# Patient Record
Sex: FEMALE | Race: WHITE | Employment: FULL TIME | URBAN - NONMETROPOLITAN AREA
[De-identification: names, ages, dates, MRNs, and addresses within clinical notes are randomized per-mention and may not be internally consistent; named-entity substitution may affect disease eponyms.]

---

## 2021-01-28 ENCOUNTER — TELEPHONE (OUTPATIENT)
Dept: OBGYN CLINIC | Age: 36
End: 2021-01-28

## 2021-05-10 ENCOUNTER — TELEPHONE (OUTPATIENT)
Dept: FAMILY MEDICINE CLINIC | Age: 36
End: 2021-05-10

## 2021-05-12 DIAGNOSIS — R53.83 FATIGUE, UNSPECIFIED TYPE: ICD-10-CM

## 2021-05-12 LAB
ALBUMIN SERPL-MCNC: 4.9 G/DL (ref 3.5–5.2)
ALP BLD-CCNC: 98 U/L (ref 35–104)
ALT SERPL-CCNC: 19 U/L (ref 5–33)
ANION GAP SERPL CALCULATED.3IONS-SCNC: 12 MMOL/L (ref 7–19)
AST SERPL-CCNC: 24 U/L (ref 5–32)
BASOPHILS ABSOLUTE: 0 K/UL (ref 0–0.2)
BASOPHILS RELATIVE PERCENT: 0.5 % (ref 0–1)
BILIRUB SERPL-MCNC: 0.3 MG/DL (ref 0.2–1.2)
BUN BLDV-MCNC: 16 MG/DL (ref 6–20)
CALCIUM SERPL-MCNC: 9.9 MG/DL (ref 8.6–10)
CHLORIDE BLD-SCNC: 105 MMOL/L (ref 98–111)
CO2: 26 MMOL/L (ref 22–29)
CREAT SERPL-MCNC: 0.7 MG/DL (ref 0.5–0.9)
EOSINOPHILS ABSOLUTE: 0.1 K/UL (ref 0–0.6)
EOSINOPHILS RELATIVE PERCENT: 0.6 % (ref 0–5)
GFR AFRICAN AMERICAN: >59
GFR NON-AFRICAN AMERICAN: >60
GLUCOSE BLD-MCNC: 102 MG/DL (ref 74–109)
HCT VFR BLD CALC: 41.1 % (ref 37–47)
HEMOGLOBIN: 14.1 G/DL (ref 12–16)
IMMATURE GRANULOCYTES #: 0 K/UL
LYMPHOCYTES ABSOLUTE: 2.3 K/UL (ref 1.1–4.5)
LYMPHOCYTES RELATIVE PERCENT: 25.8 % (ref 20–40)
MCH RBC QN AUTO: 30.9 PG (ref 27–31)
MCHC RBC AUTO-ENTMCNC: 34.3 G/DL (ref 33–37)
MCV RBC AUTO: 89.9 FL (ref 81–99)
MONOCYTES ABSOLUTE: 0.7 K/UL (ref 0–0.9)
MONOCYTES RELATIVE PERCENT: 7.6 % (ref 0–10)
NEUTROPHILS ABSOLUTE: 5.8 K/UL (ref 1.5–7.5)
NEUTROPHILS RELATIVE PERCENT: 65.2 % (ref 50–65)
PDW BLD-RTO: 12.6 % (ref 11.5–14.5)
PLATELET # BLD: 320 K/UL (ref 130–400)
PMV BLD AUTO: 9.6 FL (ref 9.4–12.3)
POTASSIUM SERPL-SCNC: 4.1 MMOL/L (ref 3.5–5)
RBC # BLD: 4.57 M/UL (ref 4.2–5.4)
SODIUM BLD-SCNC: 143 MMOL/L (ref 136–145)
T4 FREE: 1.1 NG/DL (ref 0.93–1.7)
TOTAL PROTEIN: 7.8 G/DL (ref 6.6–8.7)
TSH SERPL DL<=0.05 MIU/L-ACNC: 1.07 UIU/ML (ref 0.27–4.2)
VITAMIN B-12: 730 PG/ML (ref 211–946)
VITAMIN D 25-HYDROXY: 46.4 NG/ML
WBC # BLD: 8.8 K/UL (ref 4.8–10.8)

## 2023-12-12 ENCOUNTER — OFFICE VISIT (OUTPATIENT)
Dept: PRIMARY CARE CLINIC | Age: 38
End: 2023-12-12
Payer: MEDICAID

## 2023-12-12 VITALS
OXYGEN SATURATION: 98 % | TEMPERATURE: 98 F | SYSTOLIC BLOOD PRESSURE: 106 MMHG | HEART RATE: 77 BPM | WEIGHT: 187 LBS | DIASTOLIC BLOOD PRESSURE: 72 MMHG | HEIGHT: 64 IN | BODY MASS INDEX: 31.92 KG/M2

## 2023-12-12 DIAGNOSIS — N76.0 BACTERIAL VAGINOSIS: ICD-10-CM

## 2023-12-12 DIAGNOSIS — F41.1 GAD (GENERALIZED ANXIETY DISORDER): ICD-10-CM

## 2023-12-12 DIAGNOSIS — B96.89 BACTERIAL VAGINOSIS: ICD-10-CM

## 2023-12-12 DIAGNOSIS — M79.7 FIBROMYALGIA: ICD-10-CM

## 2023-12-12 DIAGNOSIS — Z12.31 SCREENING MAMMOGRAM FOR BREAST CANCER: ICD-10-CM

## 2023-12-12 DIAGNOSIS — F17.210 CIGARETTE NICOTINE DEPENDENCE WITHOUT COMPLICATION: ICD-10-CM

## 2023-12-12 DIAGNOSIS — F33.0 MILD EPISODE OF RECURRENT MAJOR DEPRESSIVE DISORDER (HCC): ICD-10-CM

## 2023-12-12 DIAGNOSIS — Z00.00 WELL ADULT EXAM: ICD-10-CM

## 2023-12-12 PROCEDURE — 99406 BEHAV CHNG SMOKING 3-10 MIN: CPT | Performed by: NURSE PRACTITIONER

## 2023-12-12 PROCEDURE — 99396 PREV VISIT EST AGE 40-64: CPT | Performed by: NURSE PRACTITIONER

## 2023-12-12 PROCEDURE — G8484 FLU IMMUNIZE NO ADMIN: HCPCS | Performed by: NURSE PRACTITIONER

## 2023-12-12 RX ORDER — NICOTINE 21 MG/24HR
1 PATCH, TRANSDERMAL 24 HOURS TRANSDERMAL DAILY
Qty: 42 PATCH | Refills: 0 | Status: SHIPPED | OUTPATIENT
Start: 2023-12-12 | End: 2024-01-23

## 2023-12-12 RX ORDER — NICOTINE 21 MG/24HR
1 PATCH, TRANSDERMAL 24 HOURS TRANSDERMAL DAILY
Qty: 14 PATCH | Refills: 0 | Status: SHIPPED | OUTPATIENT
Start: 2023-12-12 | End: 2023-12-26

## 2023-12-12 ASSESSMENT — ENCOUNTER SYMPTOMS
SHORTNESS OF BREATH: 0
WHEEZING: 0
ABDOMINAL PAIN: 0
CHEST TIGHTNESS: 0
DIARRHEA: 0
SORE THROAT: 0
COUGH: 0
NAUSEA: 0

## 2023-12-12 NOTE — PROGRESS NOTES
benefits of plan. Counseling: Alysha Juanito Altamirano's case, medications and options were discussed in detail. Patient was instructed to call the office if she questionsregarding her treatment. Should her conditions worsen, she should return to office to be reassessed by LUCAS Velasquez. she Should to go the closest Emergency Department for any emergency. They verbalizedunderstanding the above instructions.    Return in about 1 year (around 12/12/2024) for physical.

## 2023-12-28 ENCOUNTER — OFFICE VISIT (OUTPATIENT)
Age: 38
End: 2023-12-28

## 2023-12-28 VITALS
RESPIRATION RATE: 20 BRPM | BODY MASS INDEX: 32.17 KG/M2 | TEMPERATURE: 97.9 F | DIASTOLIC BLOOD PRESSURE: 70 MMHG | HEART RATE: 90 BPM | OXYGEN SATURATION: 95 % | HEIGHT: 64 IN | WEIGHT: 188.4 LBS | SYSTOLIC BLOOD PRESSURE: 108 MMHG

## 2023-12-28 DIAGNOSIS — Z11.1 ENCOUNTER FOR PPD TEST: Primary | ICD-10-CM

## 2023-12-28 NOTE — PROGRESS NOTES
[address]  [address] [place] [address]  Dept: [phone]  Dept Fax: [phone]  Loc: [phone]    Mariel Rutherford is a 34 y.o. female who presents today for her medical conditions/complaints as noted below. Mariel Rutherford is complaining of No chief complaint on file. HPI:   Pt here for TB skin test.  Pt instructed to return in 48-72 hours for TB skin test to be read. Pt verbalized understanding and agreement. Past Medical History:   Diagnosis Date    Chronic back pain     Depression     Fibromyalgia     MYA (generalized anxiety disorder) 2021    History of chicken pox     Major depressive disorder     Marijuana abuse     Persistent mood (affective) disorder, unspecified ([facility]) 2021       Past Surgical History:   Procedure Laterality Date    CHOLECYSTECTOMY         Family History   Problem Relation Age of Onset    Breast Cancer Maternal Grandmother     High Cholesterol Father     Other Mother         breast cancer gene (not BRCA), has had prophylactic mastectomy       Social History     Tobacco Use    Smoking status: Every Day     Current packs/day: 0.00     Average packs/day: 1 pack/day for 15.0 years (15.0 ttl pk-yrs)     Types: Cigarettes     Start date: 2002     Last attempt to quit: 2017     Years since quittin.1    Smokeless tobacco: Never   Substance Use Topics    Alcohol use: Yes     Comment: occasional        Current Outpatient Medications   Medication Sig Dispense Refill    nicotine (NICODERM CQ) 21 MG/24HR Place 1 patch onto the skin daily 42 patch 0    hydrOXYzine HCl (ATARAX) 25 MG tablet TAKE 1 TABLET BY MOUTH THREE TIMES DAILY AS NEEDED FOR ANXIETY 90 tablet 2    FLUoxetine (PROZAC) 40 MG capsule Take 1 capsule by mouth daily 30 capsule 2    neomycin-bacitracin-polymyxin (NEOSPORIN) 5-400-5000 ointment Apply topically 4 times daily.  1 each 1    fluticasone (FLONASE) 50 MCG/ACT

## 2023-12-28 NOTE — PROGRESS NOTES
Medication was administered by Gary Pitts at 10:37 AM.    Medication: tuberculin(mantoux)  Amount: 0.1ML  Route: intradermal  Site: forearm left  Patient was informed to return to the clinic for reading 48 to 72 hours. Patient tolerated well.

## 2024-01-16 ENCOUNTER — TELEPHONE (OUTPATIENT)
Dept: PSYCHIATRY | Age: 39
End: 2024-01-16

## 2024-01-16 RX ORDER — BUPROPION HYDROCHLORIDE 300 MG/1
300 TABLET ORAL EVERY MORNING
Qty: 30 TABLET | Refills: 2 | Status: SHIPPED | OUTPATIENT
Start: 2024-01-16

## 2024-01-16 NOTE — TELEPHONE ENCOUNTER
erectile dysfunction; WOMEN: LMP, heavy menstrual bleeding (menorrhagia), irregular periods, postmenopausal bleeding, menstrual pain (dymenorrhea, vaginal discharge)     Musculoskeletal: (bone pain/fracture, joint pain or swelling, musle pain)     Integumentary: (rashes, acne, non-healing sores, itching, breast lumps, breast pain, nipple discharge, hair loss)     Neurologic: (HA, muscle weakness, paresthesias (numbness, coldness, crawling or prickling), memory loss, seizure, dizziness)     Psychiatric:  (anxiety, sadness, irritability/anger, insomnia, suicidality)     Endocrine: (heat or cold intolerance, excessive thirst (polydipsia), excessive hunger (polyphagia))     Immune/Allergic: (hives, seasonal or environmental allergies, HIV exposure)     Hematologic/Lymphatic: (lymph node enlargement, easy bleeding or bruising)     History obtained via chart review and patient     PCP is Traci Conroy MD         Current Meds:     Home Medications           Prior to Admission medications    Medication Sig Start Date End Date Taking? Authorizing Provider   FLUoxetine (PROZAC) 40 MG capsule Take 1 capsule by mouth daily 11/16/23   Yes Mihai Smith APRN - CNP   buPROPion (WELLBUTRIN XL) 300 MG extended release tablet Take 1 tablet by mouth every morning 10/6/23 11/5/23   Gretchen Patel MD   neomycin-bacitracin-polymyxin (NEOSPORIN) 5-400-5000 ointment Apply topically 4 times daily. 7/2/23     Yasmin Walter MD   fluticasone (FLONASE) 50 MCG/ACT nasal spray 1 spray by Each Nostril route daily 6/22/23     Traci Conroy MD   hydrOXYzine (ATARAX) 25 MG tablet Take 1 tablet by mouth 3 times daily as needed for Anxiety 4/7/22     Mihai Smith APRN - CNP   MIRENA, 52 MG, IUD 52 mg   2/14/22     Provider, MD Josselin         Social History   Social History            Socioeconomic History    Marital status:    Tobacco Use    Smoking status: Every Day       Packs/day: 1.00       Years: 15.00

## 2024-01-16 NOTE — TELEPHONE ENCOUNTER
Called and lvm asking pt to return phone call    When pt calls back Ma will let her know that her script for bupropion was sent to her pharmacy     Electronically signed by Srinivasan Zamarripa on 1/16/2024 at 4:16 PM

## 2024-01-24 ENCOUNTER — TELEPHONE (OUTPATIENT)
Dept: PRIMARY CARE CLINIC | Age: 39
End: 2024-01-24

## 2024-01-24 DIAGNOSIS — Z11.1 SCREENING EXAMINATION FOR PULMONARY TUBERCULOSIS: Primary | ICD-10-CM

## 2024-01-24 DIAGNOSIS — Z00.00 WELL ADULT EXAM: ICD-10-CM

## 2024-01-24 DIAGNOSIS — Z11.1 SCREENING FOR TUBERCULOSIS: ICD-10-CM

## 2024-01-24 LAB
ALBUMIN SERPL-MCNC: 4.8 G/DL (ref 3.5–5.2)
ALP SERPL-CCNC: 97 U/L (ref 35–104)
ALT SERPL-CCNC: 25 U/L (ref 5–33)
ANION GAP SERPL CALCULATED.3IONS-SCNC: 11 MMOL/L (ref 7–19)
AST SERPL-CCNC: 28 U/L (ref 5–32)
BASOPHILS # BLD: 0.1 K/UL (ref 0–0.2)
BASOPHILS NFR BLD: 0.7 % (ref 0–1)
BILIRUB SERPL-MCNC: 0.6 MG/DL (ref 0.2–1.2)
BUN SERPL-MCNC: 15 MG/DL (ref 6–20)
CALCIUM SERPL-MCNC: 9.5 MG/DL (ref 8.6–10)
CHLORIDE SERPL-SCNC: 104 MMOL/L (ref 98–111)
CHOLEST SERPL-MCNC: 210 MG/DL (ref 160–199)
CO2 SERPL-SCNC: 26 MMOL/L (ref 22–29)
CREAT SERPL-MCNC: 0.7 MG/DL (ref 0.5–0.9)
EOSINOPHIL # BLD: 0.1 K/UL (ref 0–0.6)
EOSINOPHIL NFR BLD: 1.3 % (ref 0–5)
ERYTHROCYTE [DISTWIDTH] IN BLOOD BY AUTOMATED COUNT: 12.3 % (ref 11.5–14.5)
GLUCOSE SERPL-MCNC: 98 MG/DL (ref 74–109)
HCT VFR BLD AUTO: 41.7 % (ref 37–47)
HDLC SERPL-MCNC: 61 MG/DL (ref 65–121)
HGB BLD-MCNC: 13.9 G/DL (ref 12–16)
IMM GRANULOCYTES # BLD: 0 K/UL
LDLC SERPL CALC-MCNC: 128 MG/DL
LYMPHOCYTES # BLD: 2.3 K/UL (ref 1.1–4.5)
LYMPHOCYTES NFR BLD: 34.3 % (ref 20–40)
MCH RBC QN AUTO: 29.8 PG (ref 27–31)
MCHC RBC AUTO-ENTMCNC: 33.3 G/DL (ref 33–37)
MCV RBC AUTO: 89.5 FL (ref 81–99)
MONOCYTES # BLD: 0.7 K/UL (ref 0–0.9)
MONOCYTES NFR BLD: 10.4 % (ref 0–10)
NEUTROPHILS # BLD: 3.6 K/UL (ref 1.5–7.5)
NEUTS SEG NFR BLD: 52.9 % (ref 50–65)
PLATELET # BLD AUTO: 275 K/UL (ref 130–400)
PMV BLD AUTO: 9.2 FL (ref 9.4–12.3)
POTASSIUM SERPL-SCNC: 4.8 MMOL/L (ref 3.5–5)
PROT SERPL-MCNC: 7.3 G/DL (ref 6.6–8.7)
RBC # BLD AUTO: 4.66 M/UL (ref 4.2–5.4)
SODIUM SERPL-SCNC: 141 MMOL/L (ref 136–145)
T4 FREE SERPL-MCNC: 1.05 NG/DL (ref 0.93–1.7)
TRIGL SERPL-MCNC: 103 MG/DL (ref 0–149)
TSH SERPL DL<=0.005 MIU/L-ACNC: 1.31 UIU/ML (ref 0.27–4.2)
WBC # BLD AUTO: 6.8 K/UL (ref 4.8–10.8)

## 2024-01-24 NOTE — TELEPHONE ENCOUNTER
----- Message from Sara Henrik sent at 1/22/2024  8:33 AM CST -----  Subject: Message to Provider    QUESTIONS  Information for Provider? Pt requested blood test for employment. Pt told    about blood test, Please contact Pt ASAP to discuss.   ---------------------------------------------------------------------------  --------------  CALL BACK INFO  [phone]; OK to leave message on voicemail  ---------------------------------------------------------------------------  --------------  SCRIPT ANSWERS  Relationship to Patient? Self

## 2024-01-24 NOTE — TELEPHONE ENCOUNTER
Lab test ordered.  Sorry for the delay.  I have received no messages regarding her needing other testing or any messages that she has called.

## 2024-01-24 NOTE — TELEPHONE ENCOUNTER
Pt walked in due to she has called since the 17th and I dont see a return call. Pt states her work will not accept the 2 step TB skin test she has to have the lab test and needs it done today. I was going to add order but not for sure which order to put in.

## 2024-01-25 DIAGNOSIS — Z00.00 WELL ADULT EXAM: Primary | ICD-10-CM

## 2024-01-27 LAB
GAMMA INTERFERON BACKGROUND BLD IA-ACNC: 0.04 IU/ML
MITOGEN IGNF BCKGRD COR BLD-ACNC: 9.01 IU/ML
QUANTI TB GOLD PLUS: NEGATIVE
QUANTI TB1 MINUS NIL: 0.01 IU/ML (ref 0–0.34)
QUANTI TB2 MINUS NIL: 0.01 IU/ML (ref 0–0.34)

## 2024-02-21 ENCOUNTER — TELEPHONE (OUTPATIENT)
Dept: PSYCHIATRY | Age: 39
End: 2024-02-21

## 2024-02-21 NOTE — TELEPHONE ENCOUNTER
Called patient to remind them of their appointment   -left voicemail, requesting a return call     Reminded patient to complete their visit pre-check/digital registration in [facility].    Electronically signed by Jemima Giron MA on 2/21/2024 at 11:38 AM

## 2024-02-22 ENCOUNTER — TELEPHONE (OUTPATIENT)
Dept: PSYCHIATRY | Age: 39
End: 2024-02-22

## 2024-02-22 NOTE — TELEPHONE ENCOUNTER
[facility] no showed [facility]. Office called the patient to check on them and to reschedule their appointment.     Patient's appointment was rescheduled. Discussed the three no show and dismissal policies with the patient. Provided education that after three no show, patients can be dismissed by the provider and practice. Patient verbally understood. Barriers to treatment was discussed with the patient.    Barriers to treatment:  forgot       Electronically signed by Srinivasan Zamarripa on 2/22/2024 at 2:49 PM

## 2024-02-23 ENCOUNTER — OFFICE VISIT (OUTPATIENT)
Dept: PSYCHIATRY | Age: 39
End: 2024-02-23
Payer: MEDICAID

## 2024-02-23 VITALS
WEIGHT: 181 LBS | SYSTOLIC BLOOD PRESSURE: 108 MMHG | OXYGEN SATURATION: 96 % | DIASTOLIC BLOOD PRESSURE: 73 MMHG | HEART RATE: 79 BPM | HEIGHT: 64 IN | BODY MASS INDEX: 30.9 KG/M2

## 2024-02-23 DIAGNOSIS — F33.0 MILD EPISODE OF RECURRENT MAJOR DEPRESSIVE DISORDER (HCC): ICD-10-CM

## 2024-02-23 DIAGNOSIS — F41.1 GAD (GENERALIZED ANXIETY DISORDER): Primary | ICD-10-CM

## 2024-02-23 PROCEDURE — 4004F PT TOBACCO SCREEN RCVD TLK: CPT | Performed by: NURSE PRACTITIONER

## 2024-02-23 PROCEDURE — 99214 OFFICE O/P EST MOD 30 MIN: CPT | Performed by: NURSE PRACTITIONER

## 2024-02-23 PROCEDURE — G8417 CALC BMI ABV UP PARAM F/U: HCPCS | Performed by: NURSE PRACTITIONER

## 2024-02-23 PROCEDURE — G8484 FLU IMMUNIZE NO ADMIN: HCPCS | Performed by: NURSE PRACTITIONER

## 2024-02-23 PROCEDURE — G8428 CUR MEDS NOT DOCUMENT: HCPCS | Performed by: NURSE PRACTITIONER

## 2024-02-23 RX ORDER — FLUOXETINE HYDROCHLORIDE 40 MG/1
40 CAPSULE ORAL DAILY
Qty: 30 CAPSULE | Refills: 2 | Status: SHIPPED | OUTPATIENT
Start: 2024-02-23

## 2024-02-23 ASSESSMENT — PATIENT HEALTH QUESTIONNAIRE - PHQ9
7. TROUBLE CONCENTRATING ON THINGS, SUCH AS READING THE NEWSPAPER OR WATCHING TELEVISION: 0
5. POOR APPETITE OR OVEREATING: 0
SUM OF ALL RESPONSES TO PHQ QUESTIONS 1-9: 5
SUM OF ALL RESPONSES TO PHQ QUESTIONS 1-9: 5
1. LITTLE INTEREST OR PLEASURE IN DOING THINGS: 1
2. FEELING DOWN, DEPRESSED OR HOPELESS: 1
8. MOVING OR SPEAKING SO SLOWLY THAT OTHER PEOPLE COULD HAVE NOTICED. OR THE OPPOSITE, BEING SO FIGETY OR RESTLESS THAT YOU HAVE BEEN MOVING AROUND A LOT MORE THAN USUAL: 0
9. THOUGHTS THAT YOU WOULD BE BETTER OFF DEAD, OR OF HURTING YOURSELF: 0
6. FEELING BAD ABOUT YOURSELF - OR THAT YOU ARE A FAILURE OR HAVE LET YOURSELF OR YOUR FAMILY DOWN: 1
SUM OF ALL RESPONSES TO PHQ QUESTIONS 1-9: 5
10. IF YOU CHECKED OFF ANY PROBLEMS, HOW DIFFICULT HAVE THESE PROBLEMS MADE IT FOR YOU TO DO YOUR WORK, TAKE CARE OF THINGS AT HOME, OR GET ALONG WITH OTHER PEOPLE: 1
3. TROUBLE FALLING OR STAYING ASLEEP: 0
4. FEELING TIRED OR HAVING LITTLE ENERGY: 2
SUM OF ALL RESPONSES TO PHQ9 QUESTIONS 1 & 2: 2

## 2024-02-23 NOTE — PROGRESS NOTES
episode of recurrent major depressive disorder (HCC)                        No evidence of acute suicidality, homicidality or psychosis observed.  Patient is psychiatrically stable    Plan:    1.   Continue   wellbutrin xl  300 mg daily for depression  Atarax 25 mg three times a day as needed for anxiety  Prozac 40 mg daily for depression and anxiety       The risks, benefits, side effects, indications, contraindications, and adverse effects of the medications have been discussed. Yes.  2. The pt has verbalized understanding and has capacity to give informed consent.  3. The Prem report has been reviewed according to HB1 regulations.  4. Supportive therapy offered.  5. Follow up: Return in about 6 months (around 8/23/2024).  6. The patient has been advised to call with any problems.  7. Controlled substance Treatment Plan: NA.  8. The above listed medications have been continued, modifications in meds and other orders/labs as follows:      Orders Placed This Encounter   Medications    FLUoxetine (PROZAC) 40 MG capsule     Sig: Take 1 capsule by mouth daily     Dispense:  30 capsule     Refill:  2          No orders of the defined types were placed in this encounter.        9. Additional comments: continue therapy, reinforced sleep hygiene, discussed the use of coping skills and relaxation strategies to manage symptoms.     .    10.Over 50% of the total visit time of 30  minutes was spent on counseling and/or coordination of care of:                        1. MYA (generalized anxiety disorder)    2. Mild episode of recurrent major depressive disorder (HCC)                                      Psychotherapy Topics: mood/medication effectiveness family, financial and occupational    Mihai Smith, APRN - CNP    This dictation was generated by voice recognition computer software.  Although all attempts are made to edit the dictation for accuracy, there may be errors in the transcription that are not intended.

## 2024-04-22 ENCOUNTER — OFFICE VISIT (OUTPATIENT)
Dept: PRIMARY CARE CLINIC | Age: 39
End: 2024-04-22
Payer: MEDICAID

## 2024-04-22 VITALS
HEIGHT: 64 IN | OXYGEN SATURATION: 98 % | SYSTOLIC BLOOD PRESSURE: 122 MMHG | DIASTOLIC BLOOD PRESSURE: 72 MMHG | WEIGHT: 185 LBS | HEART RATE: 78 BPM | BODY MASS INDEX: 31.58 KG/M2

## 2024-04-22 DIAGNOSIS — J34.2 DEVIATED SEPTUM: ICD-10-CM

## 2024-04-22 DIAGNOSIS — J30.2 SEASONAL ALLERGIES: Primary | ICD-10-CM

## 2024-04-22 DIAGNOSIS — M79.7 FIBROMYALGIA: ICD-10-CM

## 2024-04-22 PROCEDURE — 4004F PT TOBACCO SCREEN RCVD TLK: CPT | Performed by: FAMILY MEDICINE

## 2024-04-22 PROCEDURE — G8427 DOCREV CUR MEDS BY ELIG CLIN: HCPCS | Performed by: FAMILY MEDICINE

## 2024-04-22 PROCEDURE — 99214 OFFICE O/P EST MOD 30 MIN: CPT | Performed by: FAMILY MEDICINE

## 2024-04-22 PROCEDURE — G8417 CALC BMI ABV UP PARAM F/U: HCPCS | Performed by: FAMILY MEDICINE

## 2024-04-22 RX ORDER — TIZANIDINE 4 MG/1
4 TABLET ORAL EVERY 8 HOURS PRN
COMMUNITY
Start: 2024-03-21

## 2024-04-22 RX ORDER — FLUTICASONE PROPIONATE 50 MCG
1 SPRAY, SUSPENSION (ML) NASAL DAILY
Qty: 16 G | Refills: 2 | Status: SHIPPED | OUTPATIENT
Start: 2024-04-22

## 2024-04-22 RX ORDER — LORATADINE 10 MG/1
10 TABLET ORAL DAILY
Qty: 30 TABLET | Refills: 3 | Status: SHIPPED | OUTPATIENT
Start: 2024-04-22

## 2024-04-22 SDOH — ECONOMIC STABILITY: FOOD INSECURITY: WITHIN THE PAST 12 MONTHS, THE FOOD YOU BOUGHT JUST DIDN'T LAST AND YOU DIDN'T HAVE MONEY TO GET MORE.: NEVER TRUE

## 2024-04-22 SDOH — ECONOMIC STABILITY: HOUSING INSECURITY
IN THE LAST 12 MONTHS, WAS THERE A TIME WHEN YOU DID NOT HAVE A STEADY PLACE TO SLEEP OR SLEPT IN A SHELTER (INCLUDING NOW)?: NO

## 2024-04-22 SDOH — ECONOMIC STABILITY: FOOD INSECURITY: WITHIN THE PAST 12 MONTHS, YOU WORRIED THAT YOUR FOOD WOULD RUN OUT BEFORE YOU GOT MONEY TO BUY MORE.: NEVER TRUE

## 2024-04-22 SDOH — ECONOMIC STABILITY: INCOME INSECURITY: HOW HARD IS IT FOR YOU TO PAY FOR THE VERY BASICS LIKE FOOD, HOUSING, MEDICAL CARE, AND HEATING?: NOT HARD AT ALL

## 2024-04-22 NOTE — PROGRESS NOTES
Reason For Visit:   Travis Altamirano is a 38 y.o. female who presents to the office to establish with myself today. She was previously seeing our office for her primary care.      Combined HPI and A/P:      Diagnosis Orders   1. Seasonal allergies  loratadine (CLARITIN) 10 MG tablet    fluticasone (FLONASE) 50 MCG/ACT nasal spray      2. Deviated septum  Woody Lewis MD, Otolaryngology, [place]      3. Fibromyalgia  gabapentin (NEURONTIN) 100 MG capsule          1.) Allergies: The patient has long standing seasonal allergies. She has chronic nasal congestion and rhinorrhea. I will prescribe Claritin and Flonase. The patient feels that she has difficulty breathing out of her nose at baseline. She is concerned she may have a deviated septum. I will refer to ENT for evaluation.     2.) Fibromyalgia: This is a chronic, uncontrolled problem. She follows with Dr. Metz for trigger point injections. She feels these only help mildly. She has difficulty working due to fatigue and pain. I will prescribe Gabapentin 100 mg 1 tab TID.         Return in about 4 weeks (around 5/20/2024) for Fibromyalgia.    We discussed use, benefit, and side effects of prescribed medications.  All patient questions answered.   Patient agreed with treatment plan.   I reviewed available records in our system and care everywhere. In cases where records are not available, the records have been requested and will be reviewed when available.     Subjective      Past Surgical History:   Procedure Laterality Date    CHOLECYSTECTOMY       Family History   Problem Relation Age of Onset    Breast Cancer Maternal Grandmother     High Cholesterol Father     Other Mother         breast cancer gene (not BRCA), has had prophylactic mastectomy     Social History     Tobacco Use    Smoking status: Every Day     Current packs/day: 0.00     Average packs/day: 1 pack/day for 15.0 years (15.0 ttl pk-yrs)     Types: Cigarettes     Start date:

## 2024-04-26 RX ORDER — BUPROPION HYDROCHLORIDE 300 MG/1
300 TABLET ORAL EVERY MORNING
Qty: 30 TABLET | Refills: 2 | Status: SHIPPED | OUTPATIENT
Start: 2024-04-26

## 2024-04-26 NOTE — TELEPHONE ENCOUNTER
Pharmacy sent a request to refill pt's medication       Last office visit : 2/23/2024 TAMI ZAVALA  Next office visit : 8/23/2024 TAMI ZAVALA    Requested Prescriptions     Pending Prescriptions Disp Refills    buPROPion (WELLBUTRIN XL) 300 MG extended release tablet 30 tablet 2     Sig: Take 1 tablet by mouth every morning            Srinivasan Zamarripa        2/23/24                                             Progress Note     Travis HUDSON Evelia 1985                              Chief Complaint   Patient presents with    Medication Check    Follow-up               Subjective:    Patient is a 38 y.o. female diagnosed with mood disorder and presents today for follow-up.  Last seen in clinic or telehealth on 11/16/23 and prior records were reviewed.     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  Her and her  have been working things out.  She reports her energy level is the biggest issue she is dealing with right now, aside from pain.  Overall she states she has been doing ok.  She says she also overeats at times.  We discussed increasing Prozac to 40 mg daily for depressive and anxiety.  She was agreeable with medication adjustments today and she is bright, cooperative she denies SI HI AVH.  He was encouraged to contact the office with questions concerns and/or updates.  We will follow-up in 3 months     Today: she lost her job in between appointments.  She has been unemployed for several months, she has been picking up some PRN shifts.  Her and her  have been doing well. She reports overall she has been doing really well.  She is bright calm and cooperative today, she denies si hi avh , she denies side effects of medications.  No adjustments to medications today.  Will follow up in 6 months.     Absent  suicidal ideation.    Reports compliance with medications as good .      Sleep:Gets 7 or so hours per night.     Caffeine use: 2 cups of coffee, energy pills

## 2024-04-29 RX ORDER — GABAPENTIN 100 MG/1
100 CAPSULE ORAL 3 TIMES DAILY
Qty: 90 CAPSULE | Refills: 2 | Status: SHIPPED | OUTPATIENT
Start: 2024-04-29 | End: 2024-07-28

## 2024-04-29 ASSESSMENT — ENCOUNTER SYMPTOMS
TROUBLE SWALLOWING: 0
SHORTNESS OF BREATH: 0
BACK PAIN: 1
COUGH: 0
NAUSEA: 0
CONSTIPATION: 0
WHEEZING: 0
RHINORRHEA: 1
DIARRHEA: 0
ABDOMINAL PAIN: 0
VOMITING: 0

## 2024-05-09 ENCOUNTER — OFFICE VISIT (OUTPATIENT)
Dept: ENT CLINIC | Age: 39
End: 2024-05-09
Payer: MEDICAID

## 2024-05-09 VITALS
BODY MASS INDEX: 32.1 KG/M2 | DIASTOLIC BLOOD PRESSURE: 64 MMHG | HEIGHT: 64 IN | SYSTOLIC BLOOD PRESSURE: 92 MMHG | WEIGHT: 188 LBS

## 2024-05-09 DIAGNOSIS — R09.81 NASAL CONGESTION: Primary | ICD-10-CM

## 2024-05-09 DIAGNOSIS — J34.3 HYPERTROPHY OF NASAL TURBINATES: ICD-10-CM

## 2024-05-09 DIAGNOSIS — J34.2 DEVIATED NASAL SEPTUM: ICD-10-CM

## 2024-05-09 PROCEDURE — 99213 OFFICE O/P EST LOW 20 MIN: CPT | Performed by: NURSE PRACTITIONER

## 2024-05-09 ASSESSMENT — ENCOUNTER SYMPTOMS
RESPIRATORY NEGATIVE: 1
GASTROINTESTINAL NEGATIVE: 1
ALLERGIC/IMMUNOLOGIC NEGATIVE: 1
EYES NEGATIVE: 1

## 2024-05-09 NOTE — PROGRESS NOTES
2024    Travis Altamirano (:  1985) is a 38 y.o. female, Established patient, here for evaluation of the following chief complaint(s):  New Patient (DEVIATED SEPTUM )      Vitals:    24 1528   BP: 92/64   Weight: 85.3 kg (188 lb)   Height: 1.626 m (5' 4.02\")       Wt Readings from Last 3 Encounters:   24 85.3 kg (188 lb)   24 83.9 kg (185 lb)   24 82.1 kg (181 lb)       BP Readings from Last 3 Encounters:   24 92/64   24 122/72   24 108/73         SUBJECTIVE/OBJECTIVE:    Patient seen today for her nose. She states that for quite some time she has had difficulty breathing through her nose. She states that the right side is worse than the left. She reports that her nasal congestion is worse when she lays down. She denies drainage or sinus pressure. She does report a history of trauma to the nose.        Review of Systems   Constitutional: Negative.    HENT:  Positive for congestion (R>L).    Eyes: Negative.    Respiratory: Negative.     Cardiovascular: Negative.    Gastrointestinal: Negative.    Endocrine: Negative.    Musculoskeletal: Negative.    Skin: Negative.    Allergic/Immunologic: Negative.    Neurological: Negative.    Hematological: Negative.    Psychiatric/Behavioral: Negative.          Physical Exam  Vitals reviewed.   Constitutional:       Appearance: Normal appearance. She is normal weight.   HENT:      Head: Normocephalic and atraumatic.      Right Ear: Tympanic membrane, ear canal and external ear normal.      Left Ear: Tympanic membrane, ear canal and external ear normal.      Nose: Congestion and rhinorrhea present. Rhinorrhea is clear.      Mouth/Throat:      Mouth: Mucous membranes are moist.      Pharynx: Oropharynx is clear.   Eyes:      Extraocular Movements: Extraocular movements intact.      Pupils: Pupils are equal, round, and reactive to light.   Cardiovascular:      Rate and Rhythm: Normal rate and regular rhythm.   Pulmonary:

## 2024-05-21 ENCOUNTER — OFFICE VISIT (OUTPATIENT)
Dept: PRIMARY CARE CLINIC | Age: 39
End: 2024-05-21
Payer: MEDICAID

## 2024-05-21 VITALS
DIASTOLIC BLOOD PRESSURE: 72 MMHG | BODY MASS INDEX: 32.1 KG/M2 | WEIGHT: 188 LBS | SYSTOLIC BLOOD PRESSURE: 122 MMHG | HEIGHT: 64 IN | OXYGEN SATURATION: 99 % | HEART RATE: 85 BPM

## 2024-05-21 DIAGNOSIS — M79.7 FIBROMYALGIA: Primary | ICD-10-CM

## 2024-05-21 PROCEDURE — G8417 CALC BMI ABV UP PARAM F/U: HCPCS | Performed by: FAMILY MEDICINE

## 2024-05-21 PROCEDURE — 4004F PT TOBACCO SCREEN RCVD TLK: CPT | Performed by: FAMILY MEDICINE

## 2024-05-21 PROCEDURE — G8427 DOCREV CUR MEDS BY ELIG CLIN: HCPCS | Performed by: FAMILY MEDICINE

## 2024-05-21 PROCEDURE — 99214 OFFICE O/P EST MOD 30 MIN: CPT | Performed by: FAMILY MEDICINE

## 2024-05-21 RX ORDER — GABAPENTIN 300 MG/1
300 CAPSULE ORAL 3 TIMES DAILY
Qty: 90 CAPSULE | Refills: 5 | Status: SHIPPED | OUTPATIENT
Start: 2024-05-21 | End: 2024-11-17

## 2024-05-21 NOTE — PROGRESS NOTES
Reason For Visit:   Follow up on Fibromyalgia.     Combined HPI and A/P:      Diagnosis Orders   1. Fibromyalgia  gabapentin (NEURONTIN) 300 MG capsule          1.) Fibromyalgia:         - This is a chronic, stable problem. She follows with Dr. Metz for trigger point injections. She feels these only help mildly. She has difficulty working due to fatigue and pain. I started the patient on Gabapentin 100 mg 1 tab TID. She feels that this has been helping some. I will increase this to Gabapentin 300 mg 1 cap TID.        - The patient continue to require the Gabapentin to control their symptoms. We will continue to monitor for opportunities to decrease the dose in open to be able to wean off of this in the future.        - The patient UDS, drug contract, controlled prescription monitoring is up to date and appropriate.          Return in about 6 months (around 2024).    We discussed use, benefit, and side effects of prescribed medications.  All patient questions answered.   Patient agreed with treatment plan.   I reviewed available records in our system and care everywhere. In cases where records are not available, the records have been requested and will be reviewed when available.     Subjective      Past Surgical History:   Procedure Laterality Date    CHOLECYSTECTOMY       Family History   Problem Relation Age of Onset    Breast Cancer Maternal Grandmother     High Cholesterol Father     Other Mother         breast cancer gene (not BRCA), has had prophylactic mastectomy     Social History     Tobacco Use    Smoking status: Every Day     Current packs/day: 0.00     Average packs/day: 1 pack/day for 15.0 years (15.0 ttl pk-yrs)     Types: Cigarettes     Start date: 2002     Last attempt to quit: 2017     Years since quittin.5    Smokeless tobacco: Never   Substance Use Topics    Alcohol use: Yes     Comment: occasional      Current Outpatient Medications   Medication Sig Dispense Refill

## 2024-05-28 ENCOUNTER — TELEPHONE (OUTPATIENT)
Dept: PSYCHIATRY | Age: 39
End: 2024-05-28

## 2024-05-28 ASSESSMENT — ENCOUNTER SYMPTOMS
CONSTIPATION: 0
ABDOMINAL PAIN: 0
VOMITING: 0
BACK PAIN: 1
NAUSEA: 0
DIARRHEA: 0
TROUBLE SWALLOWING: 0
SHORTNESS OF BREATH: 0
WHEEZING: 0
COUGH: 0

## 2024-05-28 NOTE — TELEPHONE ENCOUNTER
Pt called stating that she got her letter about Ketanlisa Smith leaving and wanting to make to know who she was going to be seeing.    Scheduled pt with Neda Phelan for 08/23/24 @ 2:00.    Electronically signed by Jerica Pineda MA on 5/28/2024 at 10:16 AM

## 2024-05-30 ENCOUNTER — OFFICE VISIT (OUTPATIENT)
Dept: PRIMARY CARE CLINIC | Age: 39
End: 2024-05-30
Payer: MEDICAID

## 2024-05-30 VITALS
DIASTOLIC BLOOD PRESSURE: 70 MMHG | HEART RATE: 84 BPM | BODY MASS INDEX: 32.61 KG/M2 | OXYGEN SATURATION: 96 % | HEIGHT: 64 IN | SYSTOLIC BLOOD PRESSURE: 118 MMHG | WEIGHT: 191 LBS

## 2024-05-30 DIAGNOSIS — B35.3 TINEA PEDIS OF LEFT FOOT: Primary | ICD-10-CM

## 2024-05-30 PROCEDURE — 4004F PT TOBACCO SCREEN RCVD TLK: CPT | Performed by: FAMILY MEDICINE

## 2024-05-30 PROCEDURE — G8417 CALC BMI ABV UP PARAM F/U: HCPCS | Performed by: FAMILY MEDICINE

## 2024-05-30 PROCEDURE — 99214 OFFICE O/P EST MOD 30 MIN: CPT | Performed by: FAMILY MEDICINE

## 2024-05-30 PROCEDURE — G8427 DOCREV CUR MEDS BY ELIG CLIN: HCPCS | Performed by: FAMILY MEDICINE

## 2024-05-30 RX ORDER — PRENATAL VIT 91/IRON/FOLIC/DHA 28-975-200
COMBINATION PACKAGE (EA) ORAL
Qty: 15 G | Refills: 0 | Status: SHIPPED | OUTPATIENT
Start: 2024-05-30

## 2024-05-30 NOTE — PROGRESS NOTES
Reason For Visit:   Acute Visit for left foot pain.     Combined HPI and A/P:      Diagnosis Orders   1. Tinea pedis of left foot  terbinafine (LAMISIL) 1 % cream          1.) Left Foot Rash:     - She started having symptoms several weeks ago. She has scaly skin and redness between her toes and around the callused area of the skin over the MTP's. The skin is cracked in areas. This is consistent with tinea pedis. I will prescribe Lamisil cream.        Return if symptoms worsen or fail to improve.    We discussed use, benefit, and side effects of prescribed medications.  All patient questions answered.   Patient agreed with treatment plan.   I reviewed available records in our system and care everywhere. In cases where records are not available, the records have been requested and will be reviewed when available.     Subjective      Past Surgical History:   Procedure Laterality Date    CHOLECYSTECTOMY       Family History   Problem Relation Age of Onset    Breast Cancer Maternal Grandmother     High Cholesterol Father     Other Mother         breast cancer gene (not BRCA), has had prophylactic mastectomy     Social History     Tobacco Use    Smoking status: Every Day     Current packs/day: 0.00     Average packs/day: 1 pack/day for 15.0 years (15.0 ttl pk-yrs)     Types: Cigarettes     Start date: 2002     Last attempt to quit: 2017     Years since quittin.6    Smokeless tobacco: Never   Substance Use Topics    Alcohol use: Yes     Comment: occasional      Current Outpatient Medications   Medication Sig Dispense Refill    terbinafine (LAMISIL) 1 % cream Apply topically to the affected areas on the foot 3 times daily. 15 g 0    gabapentin (NEURONTIN) 300 MG capsule Take 1 capsule by mouth 3 times daily for 180 days. Intended supply: 30 days Max Daily Amount: 900 mg 90 capsule 5    buPROPion (WELLBUTRIN XL) 300 MG extended release tablet Take 1 tablet by mouth every morning 30 tablet 2    tiZANidine

## 2024-06-06 ASSESSMENT — ENCOUNTER SYMPTOMS
BACK PAIN: 1
TROUBLE SWALLOWING: 0
VOMITING: 0
ABDOMINAL PAIN: 0
DIARRHEA: 0
SHORTNESS OF BREATH: 0
CONSTIPATION: 0
COUGH: 0
NAUSEA: 0
WHEEZING: 0

## 2024-06-07 ENCOUNTER — HOSPITAL ENCOUNTER (OUTPATIENT)
Dept: CT IMAGING | Age: 39
Discharge: HOME | End: 2024-06-07
Payer: MEDICAID

## 2024-06-07 DIAGNOSIS — J34.3 HYPERTROPHY OF NASAL TURBINATES: ICD-10-CM

## 2024-06-07 DIAGNOSIS — R09.81 NASAL CONGESTION: ICD-10-CM

## 2024-06-07 DIAGNOSIS — J34.2 DEVIATED NASAL SEPTUM: ICD-10-CM

## 2024-06-07 PROCEDURE — 70486 CT MAXILLOFACIAL W/O DYE: CPT

## 2024-06-10 ENCOUNTER — OFFICE VISIT (OUTPATIENT)
Dept: ENT CLINIC | Age: 39
End: 2024-06-10
Payer: MEDICAID

## 2024-06-10 ENCOUNTER — OFFICE VISIT (OUTPATIENT)
Dept: OBGYN CLINIC | Age: 39
End: 2024-06-10
Payer: MEDICAID

## 2024-06-10 VITALS
WEIGHT: 190 LBS | DIASTOLIC BLOOD PRESSURE: 72 MMHG | SYSTOLIC BLOOD PRESSURE: 120 MMHG | BODY MASS INDEX: 32.44 KG/M2 | HEIGHT: 64 IN

## 2024-06-10 VITALS
BODY MASS INDEX: 32.77 KG/M2 | HEART RATE: 97 BPM | SYSTOLIC BLOOD PRESSURE: 113 MMHG | DIASTOLIC BLOOD PRESSURE: 77 MMHG | WEIGHT: 191 LBS

## 2024-06-10 DIAGNOSIS — R61 SWEATING INCREASE: ICD-10-CM

## 2024-06-10 DIAGNOSIS — Z12.31 ENCOUNTER FOR SCREENING MAMMOGRAM FOR MALIGNANT NEOPLASM OF BREAST: ICD-10-CM

## 2024-06-10 DIAGNOSIS — Z01.419 WELL WOMAN EXAM WITH ROUTINE GYNECOLOGICAL EXAM: Primary | ICD-10-CM

## 2024-06-10 DIAGNOSIS — R68.82 LOW LIBIDO: ICD-10-CM

## 2024-06-10 DIAGNOSIS — Z30.431 ENCOUNTER FOR ROUTINE CHECKING OF INTRAUTERINE CONTRACEPTIVE DEVICE (IUD): ICD-10-CM

## 2024-06-10 DIAGNOSIS — Z12.39 ENCOUNTER FOR SCREENING BREAST EXAMINATION: ICD-10-CM

## 2024-06-10 DIAGNOSIS — J34.3 HYPERTROPHY OF NASAL TURBINATES: Primary | ICD-10-CM

## 2024-06-10 DIAGNOSIS — Z12.4 SCREENING FOR CERVICAL CANCER: ICD-10-CM

## 2024-06-10 PROCEDURE — 99396 PREV VISIT EST AGE 40-64: CPT | Performed by: NURSE PRACTITIONER

## 2024-06-10 PROCEDURE — 99213 OFFICE O/P EST LOW 20 MIN: CPT | Performed by: NURSE PRACTITIONER

## 2024-06-10 ASSESSMENT — ENCOUNTER SYMPTOMS
ALLERGIC/IMMUNOLOGIC NEGATIVE: 1
RESPIRATORY NEGATIVE: 1
EYES NEGATIVE: 1
GASTROINTESTINAL NEGATIVE: 1

## 2024-06-10 NOTE — PROGRESS NOTES
focal deficit present.      Mental Status: She is alert and oriented to person, place, and time.   Psychiatric:         Mood and Affect: Mood normal.         Behavior: Behavior normal.              ASSESSMENT/PLAN:    1. Hypertrophy of nasal turbinates    Follow-up in about 4-6 weeks with Dr. Garcia to discuss possible turbinate reduction, sooner if needed.    Return in about 4 weeks (around 7/8/2024).    An electronic signature was used to authenticate this note.    Helene Reid, APRN - CNP       Please note that this chart was generated using dragon dictation software.  Although every effort was made to ensure the accuracy of this automated transcription, some errors in transcription may have occurred.

## 2024-06-10 NOTE — PROGRESS NOTES
Pt presents today for pap smear and breast exam.  She also complains of sweating more frequently, and discuss doing the oxytocin for low libido.     Last mammogram:  never  Last pap smear:  2022  Contraception:  IUD  :  3  Para:  3  AB:  0  Last bone density:  never  Last colonoscopy:   never    
orders of the defined types were placed in this encounter.      ORDERS:  Orders Placed This Encounter   Procedures    MARIA ESTHER DIGITAL SCREEN W OR WO CAD BILATERAL    PAP SMEAR    Human papillomavirus (HPV) DNA probe thin prep high risk       PLAN:   Diagnosis Orders   1. Well woman exam with routine gynecological exam  pap collected, SBE reviewed and CBE performed. Mammogram ordered PAP SMEAR    Human papillomavirus (HPV) DNA probe thin prep high risk      2. Screening for cervical cancer  PAP SMEAR    Human papillomavirus (HPV) DNA probe thin prep high risk      3. Encounter for screening breast examination        4. Encounter for routine checking of intrauterine contraceptive device (IUD) - IUD strings visualized. The current IUD was placed in 2022.       5. Encounter for screening mammogram for malignant neoplasm of breast  MARI AESTHER DIGITAL SCREEN W OR WO CAD BILATERAL      6. Low libido - I will prescribe oxytocin for decreased libido.        7. Sweating increase - Pt notices the sweat increase during the day. 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 Pt will continue to monitor at this time.             I EVELINA Quintana, am scribing for and in the presence of STAR Baugh.  6/10/24  4:30 PM CDT     I have seen and examined the patient independently.  I reviewed all laboratory and imaging studies that are relevant.  I have reviewed and made any appropriate changes to the HPI.    Electronically signed by LUCAS Granados CNM on 6/18/24  at 9:49 PM CDT

## 2024-06-12 RX ORDER — FLUOXETINE HYDROCHLORIDE 40 MG/1
40 CAPSULE ORAL DAILY
Qty: 30 CAPSULE | Refills: 2 | Status: SHIPPED | OUTPATIENT
Start: 2024-06-12

## 2024-06-12 NOTE — TELEPHONE ENCOUNTER
Value Date     TRIG 103 01/24/2024            Lab Results   Component Value Date     HDL 61 (L) 01/24/2024            Lab Results   Component Value Date     LDLCALC 128 01/24/2024      No results found for: \"VLDL\"  No results found for: \"CHOLHDLRATIO\"        Lab Results   Component Value Date     LABA1C 5.5 12/08/2020      No results found for: \"EAG\"        Lab Results   Component Value Date     TSH 1.310 01/24/2024            Lab Results   Component Value Date     VITD25 46.4 05/12/2021            Lab Results   Component Value Date     MXYQHBXE56 730 05/12/2021            Lab Results   Component Value Date     FOLATE 15.2 12/08/2020         Assessment:    1. MYA (generalized anxiety disorder)    2. Mild episode of recurrent major depressive disorder (HCC)                                  No evidence of acute suicidality, homicidality or psychosis observed.  Patient is psychiatrically stable     Plan:     1.   Continue   wellbutrin xl  300 mg daily for depression  Atarax 25 mg three times a day as needed for anxiety  Prozac 40 mg daily for depression and anxiety        The risks, benefits, side effects, indications, contraindications, and adverse effects of the medications have been discussed. Yes.  2. The pt has verbalized understanding and has capacity to give informed consent.  3. The Prem report has been reviewed according to HB1 regulations.  4. Supportive therapy offered.  5. Follow up:    Return in about 6 months (around 8/23/2024).  6. The patient has been advised to call with any problems.  7. Controlled substance Treatment Plan: NA.  8. The above listed medications have been continued, modifications in meds and other orders/labs as follows:                 Encounter Medications        Orders Placed This Encounter   Medications    FLUoxetine (PROZAC) 40 MG capsule       Sig: Take 1 capsule by mouth daily       Dispense:  30 capsule       Refill:  2                           No orders of the defined types

## 2024-06-14 LAB
HPV HR 12 DNA SPEC QL NAA+PROBE: NOT DETECTED
HPV16 DNA SPEC QL NAA+PROBE: NOT DETECTED
HPV16+18+H RISK 12 DNA SPEC-IMP: NORMAL
HPV18 DNA SPEC QL NAA+PROBE: NOT DETECTED

## 2024-07-19 ENCOUNTER — OFFICE VISIT (OUTPATIENT)
Dept: PRIMARY CARE CLINIC | Age: 39
End: 2024-07-19
Payer: MEDICAID

## 2024-07-19 VITALS
HEART RATE: 115 BPM | BODY MASS INDEX: 33.97 KG/M2 | OXYGEN SATURATION: 97 % | SYSTOLIC BLOOD PRESSURE: 122 MMHG | WEIGHT: 199 LBS | DIASTOLIC BLOOD PRESSURE: 78 MMHG | HEIGHT: 64 IN

## 2024-07-19 DIAGNOSIS — R20.0 NUMBNESS OF TOES: ICD-10-CM

## 2024-07-19 DIAGNOSIS — G89.4 CHRONIC PAIN SYNDROME: Primary | ICD-10-CM

## 2024-07-19 DIAGNOSIS — L20.9: ICD-10-CM

## 2024-07-19 PROCEDURE — 99214 OFFICE O/P EST MOD 30 MIN: CPT | Performed by: FAMILY MEDICINE

## 2024-07-19 PROCEDURE — G8417 CALC BMI ABV UP PARAM F/U: HCPCS | Performed by: FAMILY MEDICINE

## 2024-07-19 PROCEDURE — 4004F PT TOBACCO SCREEN RCVD TLK: CPT | Performed by: FAMILY MEDICINE

## 2024-07-19 PROCEDURE — G8427 DOCREV CUR MEDS BY ELIG CLIN: HCPCS | Performed by: FAMILY MEDICINE

## 2024-07-19 RX ORDER — GABAPENTIN 100 MG/1
CAPSULE ORAL
Qty: 120 CAPSULE | Refills: 2 | Status: SHIPPED | OUTPATIENT
Start: 2024-07-19 | End: 2024-10-18

## 2024-07-19 RX ORDER — CLOTRIMAZOLE AND BETAMETHASONE DIPROPIONATE 10; .64 MG/G; MG/G
CREAM TOPICAL
Qty: 45 G | Refills: 0 | Status: SHIPPED | OUTPATIENT
Start: 2024-07-19

## 2024-07-19 NOTE — PROGRESS NOTES
Reason For Visit:   Acute visit for rash and toe numbness.     Combined HPI and A/P:      Diagnosis Orders   1. Chronic pain syndrome  gabapentin (NEURONTIN) 100 MG capsule      2. Constitutional eczema of foot  clotrimazole-betamethasone (LOTRISONE) 1-0.05 % cream      3. Numbness of toes  EMG          1.) Left Foot Rash:     - She was started on an antifungal cream. The blistering and erythema have improved, but she continues to have cracked and dry skin on her toes. I will prescribe Lotrisone cream.      2.) Numbness of Toes:     - She is having numbness of her first toes of her left foot. This has continued to worsen. I will order a EMG of her foot to further assess this.          Return in about 3 months (around 10/19/2024).    We discussed use, benefit, and side effects of prescribed medications.  All patient questions answered.   Patient agreed with treatment plan.   I reviewed available records in our system and care everywhere. In cases where records are not available, the records have been requested and will be reviewed when available.     Subjective      Past Surgical History:   Procedure Laterality Date    CHOLECYSTECTOMY       Family History   Problem Relation Age of Onset    Breast Cancer Maternal Grandmother     High Cholesterol Father     Other Mother         breast cancer gene (not BRCA), has had prophylactic mastectomy     Social History     Tobacco Use    Smoking status: Every Day     Current packs/day: 0.00     Average packs/day: 1 pack/day for 15.0 years (15.0 ttl pk-yrs)     Types: Cigarettes     Start date: 2002     Last attempt to quit: 2017     Years since quittin.7    Smokeless tobacco: Never   Substance Use Topics    Alcohol use: Yes     Comment: occasional      Current Outpatient Medications   Medication Sig Dispense Refill    gabapentin (NEURONTIN) 100 MG capsule Take 1 cap morning and noon, and 2 capsules at night. 120 capsule 2    clotrimazole-betamethasone (LOTRISONE)

## 2024-07-23 ENCOUNTER — OFFICE VISIT (OUTPATIENT)
Dept: ENT CLINIC | Age: 39
End: 2024-07-23
Payer: MEDICAID

## 2024-07-23 VITALS — SYSTOLIC BLOOD PRESSURE: 188 MMHG | DIASTOLIC BLOOD PRESSURE: 68 MMHG

## 2024-07-23 DIAGNOSIS — J34.3 HYPERTROPHY OF INFERIOR NASAL TURBINATE: Primary | ICD-10-CM

## 2024-07-23 PROCEDURE — 99214 OFFICE O/P EST MOD 30 MIN: CPT | Performed by: OTOLARYNGOLOGY

## 2024-07-23 PROCEDURE — G8417 CALC BMI ABV UP PARAM F/U: HCPCS | Performed by: OTOLARYNGOLOGY

## 2024-07-23 PROCEDURE — G8427 DOCREV CUR MEDS BY ELIG CLIN: HCPCS | Performed by: OTOLARYNGOLOGY

## 2024-07-23 PROCEDURE — 4004F PT TOBACCO SCREEN RCVD TLK: CPT | Performed by: OTOLARYNGOLOGY

## 2024-07-23 ASSESSMENT — ENCOUNTER SYMPTOMS
GASTROINTESTINAL NEGATIVE: 1
ALLERGIC/IMMUNOLOGIC NEGATIVE: 1
EYES NEGATIVE: 1
RESPIRATORY NEGATIVE: 1

## 2024-07-23 NOTE — PROGRESS NOTES
2024    Travis Altamirano (:  1985) is a 38 y.o. female, Established patient, here for evaluation of the following chief complaint(s):  New Patient (Nasal congestion)      Vitals:    24 1551   BP: (!) 188/68       Wt Readings from Last 3 Encounters:   24 90.3 kg (199 lb)   06/10/24 86.6 kg (191 lb)   06/10/24 86.2 kg (190 lb)       BP Readings from Last 3 Encounters:   24 (!) 188/68   24 122/78   06/10/24 113/77         SUBJECTIVE/OBJECTIVE:    Patient seen today for her nose.  She suffers from turbinate hypertrophy.  She used Afrin for many years but always uses it.  She does complain of congestion especially when she lays down.  CT scan demonstrates turbinate hypertrophy.        Review of Systems   Constitutional: Negative.    HENT:  Positive for congestion.    Eyes: Negative.    Respiratory: Negative.     Cardiovascular: Negative.    Gastrointestinal: Negative.    Endocrine: Negative.    Musculoskeletal: Negative.    Skin: Negative.    Allergic/Immunologic: Negative.    Neurological: Negative.    Hematological: Negative.    Psychiatric/Behavioral: Negative.          Physical Exam  Vitals reviewed.   Constitutional:       Appearance: Normal appearance. She is normal weight.   HENT:      Head: Normocephalic and atraumatic.      Right Ear: Tympanic membrane, ear canal and external ear normal.      Left Ear: Tympanic membrane, ear canal and external ear normal.      Nose: Nose normal.      Right Turbinates: Enlarged.      Left Turbinates: Enlarged.      Mouth/Throat:      Mouth: Mucous membranes are moist.      Pharynx: Oropharynx is clear.   Eyes:      Extraocular Movements: Extraocular movements intact.      Pupils: Pupils are equal, round, and reactive to light.   Cardiovascular:      Rate and Rhythm: Normal rate and regular rhythm.   Pulmonary:      Effort: Pulmonary effort is normal.      Breath sounds: Normal breath sounds.   Musculoskeletal:      Cervical back: Normal

## 2024-07-25 ENCOUNTER — OFFICE VISIT (OUTPATIENT)
Age: 39
End: 2024-07-25

## 2024-07-25 VITALS
BODY MASS INDEX: 33.9 KG/M2 | HEART RATE: 68 BPM | DIASTOLIC BLOOD PRESSURE: 76 MMHG | HEIGHT: 64 IN | RESPIRATION RATE: 20 BRPM | WEIGHT: 198.6 LBS | OXYGEN SATURATION: 97 % | TEMPERATURE: 97.8 F | SYSTOLIC BLOOD PRESSURE: 112 MMHG

## 2024-07-25 DIAGNOSIS — H10.9 CONJUNCTIVITIS OF RIGHT EYE, UNSPECIFIED CONJUNCTIVITIS TYPE: Primary | ICD-10-CM

## 2024-07-25 RX ORDER — TOBRAMYCIN 3 MG/ML
1 SOLUTION/ DROPS OPHTHALMIC EVERY 4 HOURS
Qty: 5 ML | Refills: 0 | Status: SHIPPED | OUTPATIENT
Start: 2024-07-25 | End: 2024-08-04

## 2024-07-25 ASSESSMENT — ENCOUNTER SYMPTOMS
EYE ITCHING: 1
WHEEZING: 0
SHORTNESS OF BREATH: 0
CHEST TIGHTNESS: 0
SORE THROAT: 0
TROUBLE SWALLOWING: 0
COLOR CHANGE: 0
ABDOMINAL DISTENTION: 0
SINUS PRESSURE: 0
COUGH: 0
ABDOMINAL PAIN: 0
EYE DISCHARGE: 1
STRIDOR: 0
EYE REDNESS: 1
EYE PAIN: 0

## 2024-07-25 NOTE — PATIENT INSTRUCTIONS
Tobramycin sent  Keep eye cleaned out  Wash hands frequently  Follow-up with PCP as needed  Go to ER for worrisome symptoms    Patient verbalized understanding and agrees to plan.

## 2024-07-25 NOTE — PROGRESS NOTES
[facility]  [facility]  [address] [place] [id number]  Dept: [phone]  Dept Fax: [phone]  Loc: [phone]    Travis Altamirano is a 38 y.o. female who presents today for her medical conditions/complaints as noted below.  Travis Altamirano is complaining of Conjunctivitis (Rt eye/Pt stated she woke up with \"goo-y\" eye )        HPI:   Conjunctivitis   Associated symptoms include eye itching, eye discharge and eye redness. Pertinent negatives include no fever, no abdominal pain, no congestion, no sore throat, no stridor, no neck pain, no cough, no wheezing, no rash and no eye pain.       Travis presents to the office complaining of right eye redness and matting.  Symptoms started this morning.  Denies injury, vision changes, and foreign body. Denies recent abx.     Past Medical History:   Diagnosis Date    Chronic back pain     Depression     Fibromyalgia     MYA (generalized anxiety disorder) 2021    History of chicken pox     Major depressive disorder     Marijuana abuse     Persistent mood (affective) disorder, unspecified (HCC) 2021       Past Surgical History:   Procedure Laterality Date    CHOLECYSTECTOMY         Family History   Problem Relation Age of Onset    Breast Cancer Maternal Grandmother     High Cholesterol Father     Other Mother         breast cancer gene (not BRCA), has had prophylactic mastectomy       Social History     Tobacco Use    Smoking status: Every Day     Current packs/day: 0.00     Average packs/day: 1 pack/day for 15.0 years (15.0 ttl pk-yrs)     Types: Cigarettes     Start date: 2002     Last attempt to quit: 2017     Years since quittin.7    Smokeless tobacco: Never   Substance Use Topics    Alcohol use: Yes     Comment: occasional        Current Outpatient Medications   Medication Sig Dispense Refill    tobramycin (TOBREX) 0.3 % ophthalmic solution Place 1 drop into the right eye every 4

## 2024-07-26 ASSESSMENT — ENCOUNTER SYMPTOMS
COUGH: 0
SHORTNESS OF BREATH: 0
NAUSEA: 0
VOMITING: 0
CONSTIPATION: 0
TROUBLE SWALLOWING: 0
ABDOMINAL PAIN: 0
WHEEZING: 0
DIARRHEA: 0

## 2024-07-29 ENCOUNTER — TELEPHONE (OUTPATIENT)
Dept: ENT CLINIC | Age: 39
End: 2024-07-29

## 2024-07-31 DIAGNOSIS — F41.9 ANXIETY: ICD-10-CM

## 2024-07-31 RX ORDER — HYDROXYZINE HYDROCHLORIDE 25 MG/1
25 TABLET, FILM COATED ORAL 3 TIMES DAILY PRN
Qty: 90 TABLET | Refills: 2 | Status: SHIPPED | OUTPATIENT
Start: 2024-07-31

## 2024-07-31 RX ORDER — BUPROPION HYDROCHLORIDE 300 MG/1
300 TABLET ORAL EVERY MORNING
Qty: 30 TABLET | Refills: 2 | Status: SHIPPED | OUTPATIENT
Start: 2024-07-31

## 2024-07-31 RX ORDER — FLUOXETINE HYDROCHLORIDE 40 MG/1
40 CAPSULE ORAL DAILY
Qty: 30 CAPSULE | Refills: 2 | Status: SHIPPED | OUTPATIENT
Start: 2024-07-31

## 2024-07-31 NOTE — TELEPHONE ENCOUNTER
FLUoxetine (PROZAC) 40 MG capsule       Sig: Take 1 capsule by mouth daily       Dispense:  30 capsule       Refill:  2                           No orders of the defined types were placed in this encounter.           9. Additional comments: continue therapy, reinforced sleep hygiene, discussed the use of coping skills and relaxation strategies to manage symptoms.      .     10.Over 50% of the total visit time of 30  minutes was spent on counseling and/or coordination of care of:                         1. MYA (generalized anxiety disorder)    2. Mild episode of recurrent major depressive disorder (HCC)                                                Psychotherapy Topics: mood/medication effectiveness family, financial and occupational     Mihai Smith, APRN - CNP

## 2024-08-02 ENCOUNTER — PREP FOR PROCEDURE (OUTPATIENT)
Dept: ENT CLINIC | Age: 39
End: 2024-08-02

## 2024-08-02 DIAGNOSIS — J34.3 HYPERTROPHY OF INFERIOR NASAL TURBINATE: ICD-10-CM

## 2024-08-06 ENCOUNTER — TELEPHONE (OUTPATIENT)
Dept: ENT CLINIC | Age: 39
End: 2024-08-06

## 2024-08-06 NOTE — TELEPHONE ENCOUNTER
Travis requests a call back please to r/s her procedure scheduled for this Friday.     Thank you.

## 2024-08-22 ENCOUNTER — TELEPHONE (OUTPATIENT)
Dept: PSYCHIATRY | Age: 39
End: 2024-08-22

## 2024-08-22 NOTE — TELEPHONE ENCOUNTER
Called and lvm reminding pt of her appt of her appt for 8/23 @ 2 PM    Electronically signed by Srinivasan Zamarripa on 8/22/2024 at 11:21 AM

## 2024-08-23 ENCOUNTER — OFFICE VISIT (OUTPATIENT)
Dept: PSYCHIATRY | Age: 39
End: 2024-08-23
Payer: MEDICAID

## 2024-08-23 VITALS
SYSTOLIC BLOOD PRESSURE: 101 MMHG | BODY MASS INDEX: 32.95 KG/M2 | HEART RATE: 85 BPM | DIASTOLIC BLOOD PRESSURE: 63 MMHG | HEIGHT: 64 IN | TEMPERATURE: 98 F | WEIGHT: 193 LBS | OXYGEN SATURATION: 96 %

## 2024-08-23 DIAGNOSIS — F33.0 MILD EPISODE OF RECURRENT MAJOR DEPRESSIVE DISORDER (HCC): Primary | ICD-10-CM

## 2024-08-23 DIAGNOSIS — F41.1 GAD (GENERALIZED ANXIETY DISORDER): ICD-10-CM

## 2024-08-23 PROCEDURE — G8417 CALC BMI ABV UP PARAM F/U: HCPCS

## 2024-08-23 PROCEDURE — 4004F PT TOBACCO SCREEN RCVD TLK: CPT

## 2024-08-23 PROCEDURE — 99214 OFFICE O/P EST MOD 30 MIN: CPT

## 2024-08-23 PROCEDURE — G8428 CUR MEDS NOT DOCUMENT: HCPCS

## 2024-08-23 RX ORDER — FLUOXETINE HYDROCHLORIDE 20 MG/1
20 CAPSULE ORAL DAILY
Qty: 30 CAPSULE | Refills: 2 | Status: SHIPPED | OUTPATIENT
Start: 2024-08-23

## 2024-08-23 ASSESSMENT — COLUMBIA-SUICIDE SEVERITY RATING SCALE - C-SSRS
2. HAVE YOU ACTUALLY HAD ANY THOUGHTS OF KILLING YOURSELF?: NO
6. HAVE YOU EVER DONE ANYTHING, STARTED TO DO ANYTHING, OR PREPARED TO DO ANYTHING TO END YOUR LIFE?: NO
1. WITHIN THE PAST MONTH, HAVE YOU WISHED YOU WERE DEAD OR WISHED YOU COULD GO TO SLEEP AND NOT WAKE UP?: NO

## 2024-08-23 ASSESSMENT — PATIENT HEALTH QUESTIONNAIRE - PHQ9
7. TROUBLE CONCENTRATING ON THINGS, SUCH AS READING THE NEWSPAPER OR WATCHING TELEVISION: SEVERAL DAYS
5. POOR APPETITE OR OVEREATING: MORE THAN HALF THE DAYS
10. IF YOU CHECKED OFF ANY PROBLEMS, HOW DIFFICULT HAVE THESE PROBLEMS MADE IT FOR YOU TO DO YOUR WORK, TAKE CARE OF THINGS AT HOME, OR GET ALONG WITH OTHER PEOPLE: SOMEWHAT DIFFICULT
9. THOUGHTS THAT YOU WOULD BE BETTER OFF DEAD, OR OF HURTING YOURSELF: SEVERAL DAYS
6. FEELING BAD ABOUT YOURSELF - OR THAT YOU ARE A FAILURE OR HAVE LET YOURSELF OR YOUR FAMILY DOWN: SEVERAL DAYS
4. FEELING TIRED OR HAVING LITTLE ENERGY: NEARLY EVERY DAY
3. TROUBLE FALLING OR STAYING ASLEEP: SEVERAL DAYS
SUM OF ALL RESPONSES TO PHQ9 QUESTIONS 1 & 2: 2
8. MOVING OR SPEAKING SO SLOWLY THAT OTHER PEOPLE COULD HAVE NOTICED. OR THE OPPOSITE, BEING SO FIGETY OR RESTLESS THAT YOU HAVE BEEN MOVING AROUND A LOT MORE THAN USUAL: NOT AT ALL
SUM OF ALL RESPONSES TO PHQ QUESTIONS 1-9: 11
SUM OF ALL RESPONSES TO PHQ QUESTIONS 1-9: 11
1. LITTLE INTEREST OR PLEASURE IN DOING THINGS: SEVERAL DAYS
2. FEELING DOWN, DEPRESSED OR HOPELESS: SEVERAL DAYS
SUM OF ALL RESPONSES TO PHQ QUESTIONS 1-9: 11
SUM OF ALL RESPONSES TO PHQ QUESTIONS 1-9: 10

## 2024-08-23 NOTE — PROGRESS NOTES
8/23/24        Progress Note    Travis Altamirano 1985      Chief Complaint   Patient presents with    Follow-up         Subjective:    Patient is a 38 y.o. female diagnosed with mood disorder, MDD, MYA, and presents today for follow-up.  Last seen in clinic on 2/23/2024 with Ketan Smith and prior records were reviewed.    Seen in office.  Calm and cooperative.  Affect is bright.  Patient reports she feels \"pretty tired today\".  Does report history of fibromyalgia.  Currently taking Prozac, Wellbutrin, hydroxyzine as needed.  She reports she has been having some increased fatigue, reports she had talked with previous provider about lowering Prozac due to this, however same dose was sent down.  Discussed with patient could lower down to see if this helps with fatigue.  We discussed the use of Cymbalta due to fibromyalgia, however patient reports she feels that she has tried this medication in the past.  She denies any suicidal ideations.  She reports she has had some increased stress because her 18-year-old son moved in with his grandparents due to them not getting along and \"he got mad because I expected him to do things around the house\".  We processed her feelings, supportive psychotherapy provided.  Plan to follow up in 6 months.      Absent  suicidal ideation.    Reports compliance with medications as good .     leep:Gets 7 or so hours per night.     Caffeine use: 2 cups of coffee, energy pills (caffeine pill), minimal soda     PREVIOUS MED TRIALS  prozac  Abilify  thinks it made her worse  zoloft  wellbutrin   pristiq 50 mg daily makes her feel worse, and tired  lamictal - hair loss     Current Substance Use:   Alcohol: not really  Illicit drug use: denies   Marijuana:   delta 9 daily  Tobacco: 1 ppd  Vape: denies       BP: /63   Pulse 85   Temp 98 °F (36.7 °C)   Ht 1.626 m (5' 4\")   Wt 87.5 kg (193 lb)   SpO2 96%   BMI 33.13 kg/m²       Review of Systems - 14 point review:  Negative

## 2024-09-04 ENCOUNTER — HOSPITAL ENCOUNTER (OUTPATIENT)
Dept: NEUROLOGY | Age: 39
Discharge: HOME | End: 2024-09-04
Payer: MEDICAID

## 2024-09-04 DIAGNOSIS — R20.0 NUMBNESS OF TOES: ICD-10-CM

## 2024-09-04 PROCEDURE — 95886 MUSC TEST DONE W/N TEST COMP: CPT | Performed by: PSYCHIATRY & NEUROLOGY

## 2024-09-04 PROCEDURE — 95909 NRV CNDJ TST 5-6 STUDIES: CPT | Performed by: PSYCHIATRY & NEUROLOGY

## 2024-09-04 PROCEDURE — 95908 NRV CNDJ TST 3-4 STUDIES: CPT

## 2024-09-04 PROCEDURE — 95886 MUSC TEST DONE W/N TEST COMP: CPT

## 2024-09-05 DIAGNOSIS — G89.18 POSTOPERATIVE PAIN: Primary | ICD-10-CM

## 2024-09-05 RX ORDER — OXYCODONE AND ACETAMINOPHEN 5; 325 MG/1; MG/1
1 TABLET ORAL EVERY 6 HOURS PRN
Qty: 12 TABLET | Refills: 0 | Status: SHIPPED | OUTPATIENT
Start: 2024-09-05 | End: 2024-09-08

## 2024-09-05 ASSESSMENT — ENCOUNTER SYMPTOMS
EYES NEGATIVE: 1
RESPIRATORY NEGATIVE: 1
GASTROINTESTINAL NEGATIVE: 1
ALLERGIC/IMMUNOLOGIC NEGATIVE: 1

## 2024-09-05 NOTE — PROCEDURES
45 ?42                 EMG Summary Table     Spontaneous MUAP Recruitment   Muscle Nerve Roots IA Fib PSW Fasc H.F. Amp Dur. PPP Pattern   L. Tibialis anterior Deep peroneal (Fibular) L4-L5 N None None None None N N N N   L. Gastrocnemius (Medial head) Tibial S1-S2 N None None None None N N N N   L. Abductor hallucis Tibial S1-S2 N None None None None N N N N   L. Extensor digitorum brevis Tibial L5-S1 N None None None None N N N N   L. Vastus lateralis Femoral L2-L4 N None None None None N N N N

## 2024-09-05 NOTE — H&P
2024    Travis Altamirano (:  1985) is a 39 y.o. female, Established patient, here for evaluation of the following chief complaint(s):  No chief complaint on file.      There were no vitals filed for this visit.      Wt Readings from Last 3 Encounters:   24 87.5 kg (193 lb)   24 90.1 kg (198 lb 9.6 oz)   24 90.3 kg (199 lb)       BP Readings from Last 3 Encounters:   24 101/63   24 112/76   24 (!) 188/68         SUBJECTIVE/OBJECTIVE:    Patient seen today for her nose.  She suffers from turbinate hypertrophy.  She used Afrin for many years but always uses it.  She does complain of congestion especially when she lays down.  CT scan demonstrates turbinate hypertrophy.        Review of Systems   Constitutional: Negative.    HENT:  Positive for congestion.    Eyes: Negative.    Respiratory: Negative.     Cardiovascular: Negative.    Gastrointestinal: Negative.    Endocrine: Negative.    Musculoskeletal: Negative.    Skin: Negative.    Allergic/Immunologic: Negative.    Neurological: Negative.    Hematological: Negative.    Psychiatric/Behavioral: Negative.          Physical Exam  Vitals reviewed.   Constitutional:       Appearance: Normal appearance. She is normal weight.   HENT:      Head: Normocephalic and atraumatic.      Right Ear: Tympanic membrane, ear canal and external ear normal.      Left Ear: Tympanic membrane, ear canal and external ear normal.      Nose: Nose normal.      Right Turbinates: Enlarged.      Left Turbinates: Enlarged.      Mouth/Throat:      Mouth: Mucous membranes are moist.      Pharynx: Oropharynx is clear.   Eyes:      Extraocular Movements: Extraocular movements intact.      Pupils: Pupils are equal, round, and reactive to light.   Cardiovascular:      Rate and Rhythm: Normal rate and regular rhythm.   Pulmonary:      Effort: Pulmonary effort is normal.      Breath sounds: Normal breath sounds.   Musculoskeletal:      Cervical back: Normal

## 2024-09-06 ENCOUNTER — HOSPITAL ENCOUNTER (OUTPATIENT)
Age: 39
Setting detail: OUTPATIENT SURGERY
Discharge: HOME | End: 2024-09-06
Attending: OTOLARYNGOLOGY | Admitting: OTOLARYNGOLOGY

## 2024-09-06 ENCOUNTER — ANESTHESIA EVENT (OUTPATIENT)
Dept: OPERATING ROOM | Age: 39
End: 2024-09-06

## 2024-09-06 ENCOUNTER — ANESTHESIA (OUTPATIENT)
Dept: OPERATING ROOM | Age: 39
End: 2024-09-06

## 2024-09-06 RX ORDER — SODIUM CHLORIDE, SODIUM LACTATE, POTASSIUM CHLORIDE, CALCIUM CHLORIDE 600; 310; 30; 20 MG/100ML; MG/100ML; MG/100ML; MG/100ML
INJECTION, SOLUTION INTRAVENOUS CONTINUOUS
Status: DISCONTINUED | OUTPATIENT
Start: 2024-09-06 | End: 2024-09-06 | Stop reason: HOSPADM

## 2024-09-06 NOTE — ANESTHESIA PRE PROCEDURE
Department of Anesthesiology  Preprocedure Note       Name:  Travis Altamirano   Age:  39 y.o.  :  1985                                          MRN:  [id number]         Date:  2024      Surgeon: Surgeon(s):  Woody Garcia MD    Procedure: Procedure(s):  Turbinate reduction    Medications prior to admission:   Prior to Admission medications    Medication Sig Start Date End Date Taking? Authorizing Provider   oxyCODONE-acetaminophen (ENDOCET) 5-325 MG per tablet Take 1 tablet by mouth every 6 hours as needed for Pain for up to 3 days. Intended supply: 3 days. Take lowest dose possible to manage pain Max Daily Amount: 4 tablets 24  Woody Garcia MD   FLUoxetine (PROZAC) 20 MG capsule Take 1 capsule by mouth daily 24   Neda Phelan APRN - CNP   hydrOXYzine HCl (ATARAX) 25 MG tablet Take 1 tablet by mouth 3 times daily as needed for Anxiety 24   Neda Phelan APRN - CNP   buPROPion (WELLBUTRIN XL) 300 MG extended release tablet Take 1 tablet by mouth every morning 24   Neda Phelan APRN - CNP   gabapentin (NEURONTIN) 100 MG capsule Take 1 cap morning and noon, and 2 capsules at night. 7/19/24 10/18/24  Rios Montenegro MD   clotrimazole-betamethasone (LOTRISONE) 1-0.05 % cream Apply to toes topically 2 times daily. 24   Rios Montenegro MD   terbinafine (LAMISIL) 1 % cream Apply topically to the affected areas on the foot 3 times daily. 24   Rios Montenegro MD   tiZANidine (ZANAFLEX) 4 MG tablet Take 1 tablet by mouth every 8 hours as needed (muscle spasms) 3/21/24   Josselin Garcia MD   loratadine (CLARITIN) 10 MG tablet Take 1 tablet by mouth daily 24   Rios Montenegro MD   MIRENA, 52 MG, IUD 52 mg  22   Josselin Garcia MD       Current medications:    Current Facility-Administered Medications   Medication Dose Route Frequency Provider Last Rate Last Admin   • lactated ringers IV soln infusion                                  Cardiovascular:  Exercise tolerance: good (>4 METS)           Beta Blocker:  Not on Beta Blocker      ROS comment: 2/28/23 - ECG - Sinus  Rhythm Rate 72  WITHIN NORMAL LIMITS     Neuro/Psych:   (+) neuromuscular disease:, psychiatric history:            GI/Hepatic/Renal: Neg GI/Hepatic/Renal ROS            Endo/Other: Negative Endo/Other ROS                    Abdominal: normal exam            Vascular: negative vascular ROS.         Other Findings:         Anesthesia Plan      general     ASA 1       Induction: intravenous.      Anesthetic plan and risks discussed with patient.      Plan discussed with CRNA.                LUCAS You - CRNA   9/6/2024

## 2024-09-16 ENCOUNTER — TELEPHONE (OUTPATIENT)
Dept: ENT CLINIC | Age: 39
End: 2024-09-16

## 2024-09-16 NOTE — TELEPHONE ENCOUNTER
Travis called to schedule an appointment for  surgery . [facility] was unable to accommodate. Please be advised that the best time to call  is afternoon .    Thank you.

## 2024-09-18 ENCOUNTER — TELEPHONE (OUTPATIENT)
Dept: ENT CLINIC | Age: 39
End: 2024-09-18

## 2024-10-14 ENCOUNTER — PREP FOR PROCEDURE (OUTPATIENT)
Dept: ENT CLINIC | Age: 39
End: 2024-10-14

## 2024-10-14 ENCOUNTER — OFFICE VISIT (OUTPATIENT)
Dept: ENT CLINIC | Age: 39
End: 2024-10-14
Payer: MEDICAID

## 2024-10-14 VITALS
WEIGHT: 196 LBS | BODY MASS INDEX: 33.46 KG/M2 | SYSTOLIC BLOOD PRESSURE: 122 MMHG | HEIGHT: 64 IN | DIASTOLIC BLOOD PRESSURE: 78 MMHG

## 2024-10-14 DIAGNOSIS — J34.3 HYPERTROPHY OF NASAL TURBINATES: ICD-10-CM

## 2024-10-14 DIAGNOSIS — J34.3 HYPERTROPHY OF INFERIOR NASAL TURBINATE: Primary | ICD-10-CM

## 2024-10-14 PROCEDURE — G8484 FLU IMMUNIZE NO ADMIN: HCPCS | Performed by: OTOLARYNGOLOGY

## 2024-10-14 PROCEDURE — 99214 OFFICE O/P EST MOD 30 MIN: CPT | Performed by: OTOLARYNGOLOGY

## 2024-10-14 PROCEDURE — G8427 DOCREV CUR MEDS BY ELIG CLIN: HCPCS | Performed by: OTOLARYNGOLOGY

## 2024-10-14 PROCEDURE — G8417 CALC BMI ABV UP PARAM F/U: HCPCS | Performed by: OTOLARYNGOLOGY

## 2024-10-14 PROCEDURE — 4004F PT TOBACCO SCREEN RCVD TLK: CPT | Performed by: OTOLARYNGOLOGY

## 2024-10-14 ASSESSMENT — ENCOUNTER SYMPTOMS
GASTROINTESTINAL NEGATIVE: 1
RESPIRATORY NEGATIVE: 1
ALLERGIC/IMMUNOLOGIC NEGATIVE: 1
EYES NEGATIVE: 1

## 2024-10-14 NOTE — PROGRESS NOTES
10/14/2024    Travis Altamirano (:  1985) is a 39 y.o. female, Established patient, here for evaluation of the following chief complaint(s):  Follow-up (Hypertrophy of inferior nasal turbinate)      Vitals:    10/14/24 1445   BP: 122/78   Weight: 88.9 kg (196 lb)   Height: 1.626 m (5' 4\")       Wt Readings from Last 3 Encounters:   10/14/24 88.9 kg (196 lb)   24 87.5 kg (193 lb)   24 90.1 kg (198 lb 9.6 oz)       BP Readings from Last 3 Encounters:   10/14/24 122/78   24 101/63   24 112/76         SUBJECTIVE/OBJECTIVE:    Patient seen today for her turbinates.  We are on the schedule last month return production but she came in drinking something and had to be canceled.  She rated get back on the schedule that she cannot breathe well through her nose.        Review of Systems   Constitutional: Negative.    HENT:  Positive for congestion.    Eyes: Negative.    Respiratory: Negative.     Cardiovascular: Negative.    Gastrointestinal: Negative.    Endocrine: Negative.    Musculoskeletal: Negative.    Skin: Negative.    Allergic/Immunologic: Negative.    Neurological: Negative.    Hematological: Negative.    Psychiatric/Behavioral: Negative.          Physical Exam  Vitals reviewed.   Constitutional:       Appearance: Normal appearance. She is normal weight.   HENT:      Head: Normocephalic and atraumatic.      Right Ear: Tympanic membrane, ear canal and external ear normal.      Left Ear: Tympanic membrane, ear canal and external ear normal.      Nose: Nose normal.      Right Turbinates: Enlarged.      Left Turbinates: Enlarged.      Mouth/Throat:      Mouth: Mucous membranes are moist.      Pharynx: Oropharynx is clear.   Eyes:      Extraocular Movements: Extraocular movements intact.      Pupils: Pupils are equal, round, and reactive to light.   Cardiovascular:      Rate and Rhythm: Normal rate and regular rhythm.   Pulmonary:      Effort: Pulmonary effort is normal.      Breath

## 2024-11-03 DIAGNOSIS — G89.4 CHRONIC PAIN SYNDROME: ICD-10-CM

## 2024-11-05 ENCOUNTER — TELEPHONE (OUTPATIENT)
Dept: PSYCHIATRY | Age: 39
End: 2024-11-05

## 2024-11-05 RX ORDER — GABAPENTIN 100 MG/1
CAPSULE ORAL
Qty: 120 CAPSULE | Refills: 2 | Status: SHIPPED | OUTPATIENT
Start: 2024-11-05 | End: 2025-02-05

## 2024-11-05 NOTE — TELEPHONE ENCOUNTER
Called and lvm asking pt to return phone call to schedule an appt    Electronically signed by Srinivasan Zamarripa on 11/5/2024 at 10:16 AM

## 2024-11-06 RX ORDER — BUPROPION HYDROCHLORIDE 300 MG/1
300 TABLET ORAL EVERY MORNING
Qty: 30 TABLET | Refills: 2 | OUTPATIENT
Start: 2024-11-06

## 2024-11-08 ENCOUNTER — ANESTHESIA EVENT (OUTPATIENT)
Dept: OPERATING ROOM | Age: 39
End: 2024-11-08

## 2024-11-11 ENCOUNTER — TELEPHONE (OUTPATIENT)
Dept: PSYCHIATRY | Age: 39
End: 2024-11-11

## 2024-11-11 NOTE — TELEPHONE ENCOUNTER
Called and let pt know that her scripts for lithium 15o mg, 300 mg, and 600 mg was sent to her pharmacy     Electronically signed by Srinivasan Zamarripa on 11/11/2024 at 4:25 PM

## 2024-11-12 ENCOUNTER — TELEPHONE (OUTPATIENT)
Dept: PSYCHIATRY | Age: 39
End: 2024-11-12

## 2024-11-12 DIAGNOSIS — G89.18 POSTOPERATIVE PAIN: Primary | ICD-10-CM

## 2024-11-12 RX ORDER — OXYCODONE AND ACETAMINOPHEN 5; 325 MG/1; MG/1
1 TABLET ORAL EVERY 6 HOURS PRN
Qty: 12 TABLET | Refills: 0 | Status: SHIPPED | OUTPATIENT
Start: 2024-11-12 | End: 2024-11-15

## 2024-11-12 ASSESSMENT — ENCOUNTER SYMPTOMS
EYES NEGATIVE: 1
RESPIRATORY NEGATIVE: 1
ALLERGIC/IMMUNOLOGIC NEGATIVE: 1
GASTROINTESTINAL NEGATIVE: 1

## 2024-11-12 NOTE — H&P
range of motion.   Skin:     General: Skin is warm and dry.   Neurological:      General: No focal deficit present.      Mental Status: She is alert and oriented to person, place, and time.   Psychiatric:         Mood and Affect: Mood normal.         Behavior: Behavior normal.              ASSESSMENT/PLAN:    1. Hypertrophy of inferior nasal turbinate  Plan for turbinate reduction.  Risk and benefits reviewed and she was to like to proceed.    No follow-ups on file.    An electronic signature was used to authenticate this note.    Woody Garcia MD       Please note that this chart was generated using dragon dictation software.  Although every effort was made to ensure the accuracy of this automated transcription, some errors in transcription may have occurred.

## 2024-11-12 NOTE — TELEPHONE ENCOUNTER
Called and lvm asking pt to return phone call to schedule an appt     Electronically signed by Srinivasan Zamarripa on 11/12/2024 at 10:45 AM

## 2024-11-13 ENCOUNTER — HOSPITAL ENCOUNTER (OUTPATIENT)
Age: 39
Setting detail: OUTPATIENT SURGERY
Discharge: HOME | End: 2024-11-13
Attending: OTOLARYNGOLOGY | Admitting: OTOLARYNGOLOGY
Payer: MEDICAID

## 2024-11-13 ENCOUNTER — ANESTHESIA (OUTPATIENT)
Dept: OPERATING ROOM | Age: 39
End: 2024-11-13

## 2024-11-13 VITALS
RESPIRATION RATE: 16 BRPM | HEIGHT: 64 IN | BODY MASS INDEX: 33.46 KG/M2 | WEIGHT: 196 LBS | SYSTOLIC BLOOD PRESSURE: 114 MMHG | TEMPERATURE: 97.6 F | HEART RATE: 70 BPM | OXYGEN SATURATION: 95 % | DIASTOLIC BLOOD PRESSURE: 83 MMHG

## 2024-11-13 LAB
CONTROL: NORMAL
PREGNANCY TEST URINE, POC: NEGATIVE

## 2024-11-13 PROCEDURE — 30802 ABLATE INF TURBINATE SUBMUC: CPT

## 2024-11-13 PROCEDURE — 30802 ABLATE INF TURBINATE SUBMUC: CPT | Performed by: OTOLARYNGOLOGY

## 2024-11-13 RX ORDER — DEXMEDETOMIDINE HYDROCHLORIDE 100 UG/ML
INJECTION, SOLUTION INTRAVENOUS
Status: DISCONTINUED | OUTPATIENT
Start: 2024-11-13 | End: 2024-11-13 | Stop reason: SDUPTHER

## 2024-11-13 RX ORDER — NALOXONE HYDROCHLORIDE 0.4 MG/ML
INJECTION, SOLUTION INTRAMUSCULAR; INTRAVENOUS; SUBCUTANEOUS PRN
Status: DISCONTINUED | OUTPATIENT
Start: 2024-11-13 | End: 2024-11-13 | Stop reason: HOSPADM

## 2024-11-13 RX ORDER — PROPOFOL 10 MG/ML
INJECTION, EMULSION INTRAVENOUS
Status: DISCONTINUED | OUTPATIENT
Start: 2024-11-13 | End: 2024-11-13 | Stop reason: SDUPTHER

## 2024-11-13 RX ORDER — OXYCODONE AND ACETAMINOPHEN 5; 325 MG/1; MG/1
1 TABLET ORAL ONCE
Status: COMPLETED | OUTPATIENT
Start: 2024-11-13 | End: 2024-11-13

## 2024-11-13 RX ORDER — SODIUM CHLORIDE, SODIUM LACTATE, POTASSIUM CHLORIDE, CALCIUM CHLORIDE 600; 310; 30; 20 MG/100ML; MG/100ML; MG/100ML; MG/100ML
INJECTION, SOLUTION INTRAVENOUS CONTINUOUS
Status: DISCONTINUED | OUTPATIENT
Start: 2024-11-13 | End: 2024-11-13 | Stop reason: HOSPADM

## 2024-11-13 RX ORDER — LIDOCAINE HYDROCHLORIDE 10 MG/ML
1 INJECTION, SOLUTION EPIDURAL; INFILTRATION; INTRACAUDAL; PERINEURAL
Status: DISCONTINUED | OUTPATIENT
Start: 2024-11-13 | End: 2024-11-13 | Stop reason: HOSPADM

## 2024-11-13 RX ORDER — ONDANSETRON 2 MG/ML
INJECTION INTRAMUSCULAR; INTRAVENOUS
Status: DISCONTINUED | OUTPATIENT
Start: 2024-11-13 | End: 2024-11-13 | Stop reason: SDUPTHER

## 2024-11-13 RX ORDER — OXYMETAZOLINE HYDROCHLORIDE 0.05 G/100ML
SPRAY NASAL PRN
Status: DISCONTINUED | OUTPATIENT
Start: 2024-11-13 | End: 2024-11-13 | Stop reason: ALTCHOICE

## 2024-11-13 RX ORDER — SODIUM CHLORIDE 9 MG/ML
INJECTION, SOLUTION INTRAVENOUS PRN
Status: DISCONTINUED | OUTPATIENT
Start: 2024-11-13 | End: 2024-11-13 | Stop reason: HOSPADM

## 2024-11-13 RX ORDER — FENTANYL CITRATE 50 UG/ML
INJECTION, SOLUTION INTRAMUSCULAR; INTRAVENOUS
Status: DISCONTINUED | OUTPATIENT
Start: 2024-11-13 | End: 2024-11-13 | Stop reason: SDUPTHER

## 2024-11-13 RX ORDER — LIDOCAINE HYDROCHLORIDE AND EPINEPHRINE 10; 10 MG/ML; UG/ML
INJECTION, SOLUTION INFILTRATION; PERINEURAL PRN
Status: DISCONTINUED | OUTPATIENT
Start: 2024-11-13 | End: 2024-11-13 | Stop reason: ALTCHOICE

## 2024-11-13 RX ORDER — ONDANSETRON 2 MG/ML
4 INJECTION INTRAMUSCULAR; INTRAVENOUS
Status: DISCONTINUED | OUTPATIENT
Start: 2024-11-13 | End: 2024-11-13 | Stop reason: HOSPADM

## 2024-11-13 RX ORDER — SODIUM CHLORIDE 0.9 % (FLUSH) 0.9 %
5-40 SYRINGE (ML) INJECTION PRN
Status: DISCONTINUED | OUTPATIENT
Start: 2024-11-13 | End: 2024-11-13 | Stop reason: HOSPADM

## 2024-11-13 RX ORDER — BUPROPION HYDROCHLORIDE 300 MG/1
300 TABLET ORAL EVERY MORNING
Qty: 30 TABLET | Refills: 2 | OUTPATIENT
Start: 2024-11-13

## 2024-11-13 RX ORDER — DEXAMETHASONE SODIUM PHOSPHATE 4 MG/ML
INJECTION, SOLUTION INTRA-ARTICULAR; INTRALESIONAL; INTRAMUSCULAR; INTRAVENOUS; SOFT TISSUE
Status: DISCONTINUED | OUTPATIENT
Start: 2024-11-13 | End: 2024-11-13 | Stop reason: SDUPTHER

## 2024-11-13 RX ORDER — DIPHENHYDRAMINE HYDROCHLORIDE 50 MG/ML
12.5 INJECTION INTRAMUSCULAR; INTRAVENOUS
Status: DISCONTINUED | OUTPATIENT
Start: 2024-11-13 | End: 2024-11-13 | Stop reason: HOSPADM

## 2024-11-13 RX ORDER — LIDOCAINE HYDROCHLORIDE 10 MG/ML
INJECTION, SOLUTION EPIDURAL; INFILTRATION; INTRACAUDAL; PERINEURAL
Status: DISCONTINUED | OUTPATIENT
Start: 2024-11-13 | End: 2024-11-13 | Stop reason: SDUPTHER

## 2024-11-13 RX ORDER — MEPERIDINE HYDROCHLORIDE 25 MG/ML
12.5 INJECTION INTRAMUSCULAR; INTRAVENOUS; SUBCUTANEOUS EVERY 5 MIN PRN
Status: DISCONTINUED | OUTPATIENT
Start: 2024-11-13 | End: 2024-11-13 | Stop reason: HOSPADM

## 2024-11-13 RX ORDER — FENTANYL CITRATE 50 UG/ML
50 INJECTION, SOLUTION INTRAMUSCULAR; INTRAVENOUS EVERY 5 MIN PRN
Status: DISCONTINUED | OUTPATIENT
Start: 2024-11-13 | End: 2024-11-13 | Stop reason: HOSPADM

## 2024-11-13 RX ADMIN — DEXAMETHASONE SODIUM PHOSPHATE 4 MG: 4 INJECTION, SOLUTION INTRA-ARTICULAR; INTRALESIONAL; INTRAMUSCULAR; INTRAVENOUS; SOFT TISSUE at 08:03

## 2024-11-13 RX ADMIN — FENTANYL CITRATE 25 MCG: 50 INJECTION, SOLUTION INTRAMUSCULAR; INTRAVENOUS at 07:56

## 2024-11-13 RX ADMIN — PROPOFOL 150 MG: 10 INJECTION, EMULSION INTRAVENOUS at 07:58

## 2024-11-13 RX ADMIN — SODIUM CHLORIDE: 9 INJECTION, SOLUTION INTRAVENOUS at 07:32

## 2024-11-13 RX ADMIN — LIDOCAINE HYDROCHLORIDE 30 MG: 10 INJECTION, SOLUTION EPIDURAL; INFILTRATION; INTRACAUDAL; PERINEURAL at 07:57

## 2024-11-13 RX ADMIN — DEXMEDETOMIDINE HYDROCHLORIDE 10 MCG: 100 INJECTION, SOLUTION INTRAVENOUS at 07:51

## 2024-11-13 RX ADMIN — PROPOFOL 50 MG: 10 INJECTION, EMULSION INTRAVENOUS at 07:57

## 2024-11-13 RX ADMIN — ONDANSETRON 4 MG: 2 INJECTION INTRAMUSCULAR; INTRAVENOUS at 08:03

## 2024-11-13 RX ADMIN — OXYCODONE AND ACETAMINOPHEN 1 TABLET: 5; 325 TABLET ORAL at 08:40

## 2024-11-13 ASSESSMENT — PAIN DESCRIPTION - LOCATION: LOCATION: NOSE

## 2024-11-13 ASSESSMENT — PAIN - FUNCTIONAL ASSESSMENT
PAIN_FUNCTIONAL_ASSESSMENT: ADULT NONVERBAL PAIN SCALE (NPVS)
PAIN_FUNCTIONAL_ASSESSMENT: 0-10
PAIN_FUNCTIONAL_ASSESSMENT: NONE - DENIES PAIN

## 2024-11-13 ASSESSMENT — PAIN SCALES - GENERAL: PAINLEVEL_OUTOF10: 7

## 2024-11-13 NOTE — BRIEF OP NOTE
Brief Postoperative Note      Patient: Travis Altamirano  YOB: 1985  MRN: [id number]    Date of Procedure: 11/13/2024    Pre-Op Diagnosis Codes:      * Hypertrophy of nasal turbinates [J34.3]    Post-Op Diagnosis: Same       Procedure(s):  Turbinate reduction    Surgeon(s):  Woody Garcia MD    Assistant:  * No surgical staff found *    Anesthesia: Choice    Estimated Blood Loss (mL): Minimal    Complications: None    Specimens:   * No specimens in log *    Implants:  * No implants in log *      Drains: * No LDAs found *    Findings:  Infection Present At Time Of Surgery (PATOS) (choose all levels that have infection present):  No infection present  Other Findings: Turbinate hypertrophy    Electronically signed by Woody Garcia MD on 11/13/2024 at 8:17 AM

## 2024-11-13 NOTE — ANESTHESIA POSTPROCEDURE EVALUATION
Department of Anesthesiology  Postprocedure Note    Patient: Travis Altamirano  MRN: [id number]  YOB: 1985  Date of evaluation: 11/13/2024    Procedure Summary       Date: 11/13/24 Room / Location: [address]    Anesthesia Start: 0753 Anesthesia Stop:     Procedure: Turbinate reduction Diagnosis:       Hypertrophy of nasal turbinates      (Hypertrophy of nasal turbinates [J34.3])    Surgeons: Woody Garcia MD Responsible Provider: Jade Johnson APRN - CRNA    Anesthesia Type: general ASA Status: 1            Anesthesia Type: No value filed.    Jorden Phase I:      Jorden Phase II:      Anesthesia Post Evaluation    Patient location during evaluation: PACU  Patient participation: waiting for patient participation  Level of consciousness: responsive to physical stimuli  Pain score: 0  Airway patency: patent  Nausea & Vomiting: no nausea and no vomiting  Cardiovascular status: blood pressure returned to baseline  Respiratory status: acceptable, face mask, oral airway and spontaneous ventilation  Hydration status: euvolemic  Pain management: adequate    No notable events documented.

## 2024-11-13 NOTE — ADDENDUM NOTE
Addendum  created 11/13/24 0836 by Jade Johnson APRN - CRNA    Intraprocedure Meds edited, Orders acknowledged in Narrator

## 2024-11-13 NOTE — INTERVAL H&P NOTE
Update History & Physical    The patient's History and Physical of October 14, 2024 was reviewed with the patient and I examined the patient. There was no change. The surgical site was confirmed by the patient and me.     Plan: The risks, benefits, expected outcome, and alternative to the recommended procedure have been discussed with the patient. Patient understands and wants to proceed with the procedure.     Electronically signed by Woody Garcia MD on 11/13/2024 at 7:48 AM

## 2024-11-13 NOTE — OP NOTE
Operative Note      Patient: Travis Altamirano  YOB: 1985  MRN: [id number]    Date of Procedure: 11/13/2024    Pre-Op Diagnosis Codes:      * Hypertrophy of nasal turbinates [J34.3]    Post-Op Diagnosis: Same       Procedure(s):  Turbinate reduction    Surgeon(s):  Woody Garcia MD    Assistant:   * No surgical staff found *    Anesthesia: Choice    Estimated Blood Loss (mL): Minimal    Complications: None    Specimens:   * No specimens in log *    Implants:  * No implants in log *      Drains: * No LDAs found *    Findings:  Infection Present At Time Of Surgery (PATOS) (choose all levels that have infection present):  No infection present  Other Findings: Turbinate hypertrophy    Detailed Description of Procedure:   After obtaining informed consent, the patient was taken to the operative room and placed op table in supine position.  After induction of general LMA anesthesia the patient was prepped in standard fashion for turbinate reduction.  Once a timeout was performed each inferior turbinate was injected with lidocaine with epinephrine.  Once this took effect the wand was inserted into the right inferior turbinate and activated for 20 seconds.  It was then reapproximated activated again.  Left ear for turbinate was addressed in similar fashion.  Once we the nasal cavities were patent.  Afrin-soaked pledgets were placed bilaterally and the patient was returned anesthesia having suffered no complications    Electronically signed by Woody Garcia MD on 11/13/2024 at 8:17 AM

## 2024-11-13 NOTE — DISCHARGE INSTRUCTIONS
Please call Dr. Garcia with questions or concerns.  Pain meds as needed and expect some bleeding for a day or 2.  Follow-up in about a month   Spouse

## 2024-11-13 NOTE — ANESTHESIA PRE PROCEDURE
Department of Anesthesiology  Preprocedure Note       Name:  Travis Altamirano   Age:  39 y.o.  :  1985                                          MRN:  [id number]         Date:  2024      Surgeon: Surgeon(s):  Woody Garcia MD    Procedure: Procedure(s):  Turbinate reduction    Medications prior to admission:   Prior to Admission medications    Medication Sig Start Date End Date Taking? Authorizing Provider   gabapentin (NEURONTIN) 100 MG capsule TAKE 1 CAPSULE BY MOUTH IN THE MORNING AND AT NOON AND 2 CAPSULES BY MOUTH AT NIGHT 24 Yes Rios Montenegro MD   FLUoxetine (PROZAC) 20 MG capsule Take 1 capsule by mouth daily 24  Yes Neda Phelan APRN - CNP   hydrOXYzine HCl (ATARAX) 25 MG tablet Take 1 tablet by mouth 3 times daily as needed for Anxiety 24  Yes Neda Phelan APRN - CNP   buPROPion (WELLBUTRIN XL) 300 MG extended release tablet Take 1 tablet by mouth every morning 24  Yes Neda Phelan APRN - CNP   tiZANidine (ZANAFLEX) 4 MG tablet Take 1 tablet by mouth every 8 hours as needed (muscle spasms) 3/21/24  Yes ProviderJosselin MD   MIRENA, 52 MG, IUD 52 mg  22  Yes ProviderJosselin MD   oxyCODONE-acetaminophen (ENDOCET) 5-325 MG per tablet Take 1 tablet by mouth every 6 hours as needed for Pain for up to 3 days. Intended supply: 3 days. Take lowest dose possible to manage pain Max Daily Amount: 4 tablets 11/12/24 11/15/24  Woody Garcia MD   clotrimazole-betamethasone (LOTRISONE) 1-0.05 % cream Apply to toes topically 2 times daily. 24   Rios Montenegro MD       Current medications:    Current Facility-Administered Medications   Medication Dose Route Frequency Provider Last Rate Last Admin   • lidocaine PF 1 % injection 1 mL  1 mL IntraDERmal Once PRN Clementina Johnsona, APRN - CRNA       • sodium chloride flush 0.9 % injection 5-40 mL  5-40 mL IntraVENous PRN Hettler, Jade, APRN - CRNA       • 0.9 % sodium

## 2024-11-14 ENCOUNTER — TELEPHONE (OUTPATIENT)
Dept: PSYCHIATRY | Age: 39
End: 2024-11-14

## 2024-11-14 NOTE — TELEPHONE ENCOUNTER
Called and lvm asking pt to return phone call to schedule an appt.    MA has been trying to schedule an appt with pt since 11/11/2024.    Electronically signed by Srinivasan Zamarripa on 11/14/2024 at 10:19 AM

## 2024-12-16 ENCOUNTER — OFFICE VISIT (OUTPATIENT)
Dept: ENT CLINIC | Age: 39
End: 2024-12-16
Payer: MEDICAID

## 2024-12-16 VITALS
DIASTOLIC BLOOD PRESSURE: 62 MMHG | WEIGHT: 204 LBS | SYSTOLIC BLOOD PRESSURE: 100 MMHG | HEIGHT: 64 IN | BODY MASS INDEX: 34.83 KG/M2

## 2024-12-16 DIAGNOSIS — J34.3 HYPERTROPHY OF NASAL TURBINATES: Primary | ICD-10-CM

## 2024-12-16 PROCEDURE — G8427 DOCREV CUR MEDS BY ELIG CLIN: HCPCS | Performed by: OTOLARYNGOLOGY

## 2024-12-16 PROCEDURE — G8417 CALC BMI ABV UP PARAM F/U: HCPCS | Performed by: OTOLARYNGOLOGY

## 2024-12-16 PROCEDURE — G8484 FLU IMMUNIZE NO ADMIN: HCPCS | Performed by: OTOLARYNGOLOGY

## 2024-12-16 PROCEDURE — 99213 OFFICE O/P EST LOW 20 MIN: CPT | Performed by: OTOLARYNGOLOGY

## 2024-12-16 PROCEDURE — 4004F PT TOBACCO SCREEN RCVD TLK: CPT | Performed by: OTOLARYNGOLOGY

## 2024-12-16 NOTE — PROGRESS NOTES
2024    Travis Altamirano (:  1985) is a 39 y.o. female, Established patient, here for evaluation of the following chief complaint(s):  Follow-up (ears)      Vitals:    24 1618   BP: 100/62   Weight: 92.5 kg (204 lb)   Height: 1.626 m (5' 4.02\")       Wt Readings from Last 3 Encounters:   24 92.5 kg (204 lb)   24 88.9 kg (196 lb)   10/14/24 88.9 kg (196 lb)       BP Readings from Last 3 Encounters:   24 100/62   24 114/83   10/14/24 122/78         SUBJECTIVE/OBJECTIVE:    Patient seen today for her turbinates.  About a month ago I performed turbinate ablation and she is doing very well.  Says she is breathing much better.  She is happy with the results.        Review of Systems   Constitutional: Negative.    HENT: Negative.     Eyes: Negative.    Respiratory: Negative.     Cardiovascular: Negative.    Gastrointestinal: Negative.    Endocrine: Negative.    Musculoskeletal: Negative.    Skin: Negative.    Allergic/Immunologic: Negative.    Neurological: Negative.    Hematological: Negative.    Psychiatric/Behavioral: Negative.          Physical Exam  Vitals reviewed.   Constitutional:       Appearance: Normal appearance. She is normal weight.   HENT:      Head: Normocephalic and atraumatic.      Right Ear: Tympanic membrane, ear canal and external ear normal.      Left Ear: Tympanic membrane, ear canal and external ear normal.      Nose: Nose normal.      Mouth/Throat:      Mouth: Mucous membranes are moist.      Pharynx: Oropharynx is clear.   Eyes:      Extraocular Movements: Extraocular movements intact.      Pupils: Pupils are equal, round, and reactive to light.   Cardiovascular:      Rate and Rhythm: Normal rate and regular rhythm.   Pulmonary:      Effort: Pulmonary effort is normal.      Breath sounds: Normal breath sounds.   Musculoskeletal:      Cervical back: Normal range of motion.   Skin:     General: Skin is warm and dry.   Neurological:      General: No

## 2024-12-19 ENCOUNTER — OFFICE VISIT (OUTPATIENT)
Dept: PRIMARY CARE CLINIC | Age: 39
End: 2024-12-19
Payer: MEDICAID

## 2024-12-19 VITALS
WEIGHT: 204 LBS | HEART RATE: 79 BPM | BODY MASS INDEX: 34.83 KG/M2 | HEIGHT: 64 IN | SYSTOLIC BLOOD PRESSURE: 112 MMHG | DIASTOLIC BLOOD PRESSURE: 78 MMHG | TEMPERATURE: 97.3 F | OXYGEN SATURATION: 98 %

## 2024-12-19 DIAGNOSIS — J02.9 SORE THROAT: ICD-10-CM

## 2024-12-19 DIAGNOSIS — J02.0 STREP PHARYNGITIS: Primary | ICD-10-CM

## 2024-12-19 LAB — S PYO AG THROAT QL: NORMAL

## 2024-12-19 PROCEDURE — G8484 FLU IMMUNIZE NO ADMIN: HCPCS | Performed by: NURSE PRACTITIONER

## 2024-12-19 PROCEDURE — 4004F PT TOBACCO SCREEN RCVD TLK: CPT | Performed by: NURSE PRACTITIONER

## 2024-12-19 PROCEDURE — 99214 OFFICE O/P EST MOD 30 MIN: CPT | Performed by: NURSE PRACTITIONER

## 2024-12-19 PROCEDURE — G8417 CALC BMI ABV UP PARAM F/U: HCPCS | Performed by: NURSE PRACTITIONER

## 2024-12-19 PROCEDURE — 87880 STREP A ASSAY W/OPTIC: CPT | Performed by: NURSE PRACTITIONER

## 2024-12-19 PROCEDURE — G8427 DOCREV CUR MEDS BY ELIG CLIN: HCPCS | Performed by: NURSE PRACTITIONER

## 2024-12-19 RX ORDER — AMOXICILLIN 500 MG/1
500 CAPSULE ORAL 2 TIMES DAILY
Qty: 20 CAPSULE | Refills: 0 | Status: SHIPPED | OUTPATIENT
Start: 2024-12-19 | End: 2024-12-29

## 2024-12-19 ASSESSMENT — ENCOUNTER SYMPTOMS
COLOR CHANGE: 0
ABDOMINAL PAIN: 0
SHORTNESS OF BREATH: 0
VOMITING: 0
NAUSEA: 0
COUGH: 0
DIARRHEA: 0
SORE THROAT: 1
CHEST TIGHTNESS: 0

## 2024-12-19 NOTE — PROGRESS NOTES
Ms.Andrea HUDSON Altamirano is a 39 y.o. female who presents today for  Chief Complaint   Patient presents with    Pharyngitis    Ear Pain    Generalized Body Aches    Chest Congestion       HPI:  Patient here today with complaints of cough and congestion that been present for the past week, but states that her throat began hurting last night patient states that she took some leftover amoxicillin, but states that it is still painful to swallow.  Patient denies any associated fever.    Review of Systems   Constitutional:  Negative for activity change and fever.   HENT:  Positive for sore throat. Negative for congestion and ear pain.    Respiratory:  Negative for cough, chest tightness and shortness of breath.    Cardiovascular:  Negative for chest pain.   Gastrointestinal:  Negative for abdominal pain, diarrhea, nausea and vomiting.   Genitourinary:  Negative for frequency and urgency.   Musculoskeletal:  Negative for arthralgias and myalgias.   Skin:  Negative for color change.   Neurological:  Negative for dizziness, weakness and numbness.   Psychiatric/Behavioral:  Negative for agitation. The patient is not nervous/anxious.        Past Medical History:   Diagnosis Date    Chronic back pain     Depression     Fibromyalgia     MYA (generalized anxiety disorder) 05/12/2021    History of chicken pox     Major depressive disorder     Marijuana abuse     Persistent mood (affective) disorder, unspecified ([facility]) 06/24/2021       Current Outpatient Medications   Medication Sig Dispense Refill    amoxicillin (AMOXIL) 500 MG capsule Take 1 capsule by mouth 2 times daily for 10 days 20 capsule 0    gabapentin (NEURONTIN) 100 MG capsule TAKE 1 CAPSULE BY MOUTH IN THE MORNING AND AT NOON AND 2 CAPSULES BY MOUTH AT NIGHT 120 capsule 2    FLUoxetine (PROZAC) 20 MG capsule Take 1 capsule by mouth daily 30 capsule 2    hydrOXYzine HCl (ATARAX) 25 MG tablet Take 1 tablet by mouth 3 times daily as needed for Anxiety 90 tablet 2    buPROPion

## 2025-03-14 ENCOUNTER — OFFICE VISIT (OUTPATIENT)
Dept: PRIMARY CARE CLINIC | Age: 40
End: 2025-03-14
Payer: MEDICAID

## 2025-03-14 VITALS
HEART RATE: 90 BPM | SYSTOLIC BLOOD PRESSURE: 110 MMHG | BODY MASS INDEX: 34.56 KG/M2 | WEIGHT: 202.4 LBS | TEMPERATURE: 98.4 F | OXYGEN SATURATION: 98 % | HEIGHT: 64 IN | DIASTOLIC BLOOD PRESSURE: 70 MMHG

## 2025-03-14 DIAGNOSIS — M79.7 FIBROMYALGIA: ICD-10-CM

## 2025-03-14 DIAGNOSIS — F33.2 SEVERE EPISODE OF RECURRENT MAJOR DEPRESSIVE DISORDER, WITHOUT PSYCHOTIC FEATURES (HCC): Primary | ICD-10-CM

## 2025-03-14 DIAGNOSIS — F41.1 GAD (GENERALIZED ANXIETY DISORDER): ICD-10-CM

## 2025-03-14 PROCEDURE — G8417 CALC BMI ABV UP PARAM F/U: HCPCS | Performed by: FAMILY MEDICINE

## 2025-03-14 PROCEDURE — 4004F PT TOBACCO SCREEN RCVD TLK: CPT | Performed by: FAMILY MEDICINE

## 2025-03-14 PROCEDURE — G8427 DOCREV CUR MEDS BY ELIG CLIN: HCPCS | Performed by: FAMILY MEDICINE

## 2025-03-14 PROCEDURE — 99214 OFFICE O/P EST MOD 30 MIN: CPT | Performed by: FAMILY MEDICINE

## 2025-03-14 RX ORDER — HYDROXYZINE HYDROCHLORIDE 25 MG/1
25 TABLET, FILM COATED ORAL 3 TIMES DAILY PRN
Qty: 90 TABLET | Refills: 5 | Status: SHIPPED | OUTPATIENT
Start: 2025-03-14

## 2025-03-14 RX ORDER — GABAPENTIN 400 MG/1
400 CAPSULE ORAL 3 TIMES DAILY
Qty: 90 CAPSULE | Refills: 2 | Status: SHIPPED | OUTPATIENT
Start: 2025-03-14 | End: 2025-06-12

## 2025-03-14 SDOH — ECONOMIC STABILITY: FOOD INSECURITY: WITHIN THE PAST 12 MONTHS, YOU WORRIED THAT YOUR FOOD WOULD RUN OUT BEFORE YOU GOT MONEY TO BUY MORE.: NEVER TRUE

## 2025-03-14 SDOH — ECONOMIC STABILITY: FOOD INSECURITY: WITHIN THE PAST 12 MONTHS, THE FOOD YOU BOUGHT JUST DIDN'T LAST AND YOU DIDN'T HAVE MONEY TO GET MORE.: NEVER TRUE

## 2025-03-14 ASSESSMENT — PATIENT HEALTH QUESTIONNAIRE - PHQ9
9. THOUGHTS THAT YOU WOULD BE BETTER OFF DEAD, OR OF HURTING YOURSELF: SEVERAL DAYS
SUM OF ALL RESPONSES TO PHQ QUESTIONS 1-9: 8
8. MOVING OR SPEAKING SO SLOWLY THAT OTHER PEOPLE COULD HAVE NOTICED. OR THE OPPOSITE, BEING SO FIGETY OR RESTLESS THAT YOU HAVE BEEN MOVING AROUND A LOT MORE THAN USUAL: NOT AT ALL
2. FEELING DOWN, DEPRESSED OR HOPELESS: MORE THAN HALF THE DAYS
SUM OF ALL RESPONSES TO PHQ QUESTIONS 1-9: 9
1. LITTLE INTEREST OR PLEASURE IN DOING THINGS: SEVERAL DAYS
6. FEELING BAD ABOUT YOURSELF - OR THAT YOU ARE A FAILURE OR HAVE LET YOURSELF OR YOUR FAMILY DOWN: MORE THAN HALF THE DAYS
4. FEELING TIRED OR HAVING LITTLE ENERGY: MORE THAN HALF THE DAYS
10. IF YOU CHECKED OFF ANY PROBLEMS, HOW DIFFICULT HAVE THESE PROBLEMS MADE IT FOR YOU TO DO YOUR WORK, TAKE CARE OF THINGS AT HOME, OR GET ALONG WITH OTHER PEOPLE: EXTREMELY DIFFICULT
SUM OF ALL RESPONSES TO PHQ QUESTIONS 1-9: 9
SUM OF ALL RESPONSES TO PHQ QUESTIONS 1-9: 9
7. TROUBLE CONCENTRATING ON THINGS, SUCH AS READING THE NEWSPAPER OR WATCHING TELEVISION: SEVERAL DAYS
5. POOR APPETITE OR OVEREATING: NOT AT ALL
3. TROUBLE FALLING OR STAYING ASLEEP: NOT AT ALL

## 2025-03-14 ASSESSMENT — COLUMBIA-SUICIDE SEVERITY RATING SCALE - C-SSRS
6. HAVE YOU EVER DONE ANYTHING, STARTED TO DO ANYTHING, OR PREPARED TO DO ANYTHING TO END YOUR LIFE?: NO
1. WITHIN THE PAST MONTH, HAVE YOU WISHED YOU WERE DEAD OR WISHED YOU COULD GO TO SLEEP AND NOT WAKE UP?: YES
5. HAVE YOU STARTED TO WORK OUT OR WORKED OUT THE DETAILS OF HOW TO KILL YOURSELF? DO YOU INTEND TO CARRY OUT THIS PLAN?: NO
2. HAVE YOU ACTUALLY HAD ANY THOUGHTS OF KILLING YOURSELF?: YES
4. HAVE YOU HAD THESE THOUGHTS AND HAD SOME INTENTION OF ACTING ON THEM?: NO
3. HAVE YOU BEEN THINKING ABOUT HOW YOU MIGHT KILL YOURSELF?: NO

## 2025-03-14 NOTE — PROGRESS NOTES
fever.   HENT:  Negative for hearing loss and trouble swallowing.    Eyes:  Negative for visual disturbance.   Respiratory:  Negative for cough, shortness of breath and wheezing.    Cardiovascular:  Negative for chest pain and palpitations.   Gastrointestinal:  Negative for abdominal pain, constipation, diarrhea, nausea and vomiting.   Musculoskeletal:  Positive for myalgias.   Skin:  Negative for wound.   Neurological:  Positive for numbness. Negative for dizziness, seizures and syncope.   Psychiatric/Behavioral:  Positive for dysphoric mood. The patient is nervous/anxious.          Objective:   /70   Pulse 90   Temp 98.4 °F (36.9 °C) (Temporal)   Ht 1.626 m (5' 4\")   Wt 91.8 kg (202 lb 6.4 oz)   SpO2 98%   BMI 34.74 kg/m²   Physical Exam  Vitals and nursing note reviewed.   Constitutional:       General: She is not in acute distress.     Appearance: Normal appearance. She is obese. She is not ill-appearing.   HENT:      Head: Normocephalic and atraumatic.   Cardiovascular:      Rate and Rhythm: Normal rate and regular rhythm.      Heart sounds: No murmur heard.     No friction rub. No gallop.   Pulmonary:      Effort: Pulmonary effort is normal. No respiratory distress.      Breath sounds: Normal breath sounds. No rhonchi.   Abdominal:      General: Abdomen is flat.      Palpations: Abdomen is soft. There is no mass.      Tenderness: There is no abdominal tenderness.   Musculoskeletal:         General: Tenderness present.   Skin:     General: Skin is warm and dry.   Neurological:      Mental Status: She is alert.   Psychiatric:         Mood and Affect: Mood normal.         Behavior: Behavior normal.           No results found for this visit on 03/14/25.          Rios Montenegro MD

## 2025-03-21 ASSESSMENT — ENCOUNTER SYMPTOMS
NAUSEA: 0
SHORTNESS OF BREATH: 0
WHEEZING: 0
ABDOMINAL PAIN: 0
VOMITING: 0
TROUBLE SWALLOWING: 0
CONSTIPATION: 0
DIARRHEA: 0
COUGH: 0

## 2025-03-27 DIAGNOSIS — F33.2 SEVERE EPISODE OF RECURRENT MAJOR DEPRESSIVE DISORDER, WITHOUT PSYCHOTIC FEATURES (HCC): ICD-10-CM

## 2025-03-27 DIAGNOSIS — F41.1 GAD (GENERALIZED ANXIETY DISORDER): ICD-10-CM

## 2025-03-27 NOTE — TELEPHONE ENCOUNTER
Travis called requesting a refill of the below medication which has been pended for you:     Requested Prescriptions     Pending Prescriptions Disp Refills    cariprazine hcl (VRAYLAR) 1.5 MG capsule 30 capsule 2     Sig: Take 1 capsule by mouth daily       Last Appointment Date: Visit date not found  Next Appointment Date: Visit date not found    No Known Allergies

## 2025-06-04 ENCOUNTER — TELEPHONE (OUTPATIENT)
Dept: PRIMARY CARE CLINIC | Age: 40
End: 2025-06-04

## 2025-06-12 ASSESSMENT — ENCOUNTER SYMPTOMS
SHORTNESS OF BREATH: 0
ABDOMINAL PAIN: 0
COUGH: 0
WHEEZING: 0
NAUSEA: 0
CHEST TIGHTNESS: 0
SORE THROAT: 0

## 2025-06-13 ENCOUNTER — OFFICE VISIT (OUTPATIENT)
Dept: PRIMARY CARE CLINIC | Age: 40
End: 2025-06-13
Payer: MEDICAID

## 2025-06-13 VITALS
OXYGEN SATURATION: 98 % | HEIGHT: 64 IN | TEMPERATURE: 98.6 F | WEIGHT: 195 LBS | DIASTOLIC BLOOD PRESSURE: 70 MMHG | BODY MASS INDEX: 33.29 KG/M2 | HEART RATE: 86 BPM | SYSTOLIC BLOOD PRESSURE: 110 MMHG

## 2025-06-13 DIAGNOSIS — D50.9 IRON DEFICIENCY ANEMIA, UNSPECIFIED IRON DEFICIENCY ANEMIA TYPE: ICD-10-CM

## 2025-06-13 DIAGNOSIS — M25.552 BILATERAL HIP PAIN: ICD-10-CM

## 2025-06-13 DIAGNOSIS — M25.551 BILATERAL HIP PAIN: ICD-10-CM

## 2025-06-13 DIAGNOSIS — F41.1 GAD (GENERALIZED ANXIETY DISORDER): ICD-10-CM

## 2025-06-13 DIAGNOSIS — E55.9 VITAMIN D DEFICIENCY: ICD-10-CM

## 2025-06-13 DIAGNOSIS — Z76.89 ENCOUNTER TO ESTABLISH CARE: ICD-10-CM

## 2025-06-13 DIAGNOSIS — R53.83 FATIGUE, UNSPECIFIED TYPE: ICD-10-CM

## 2025-06-13 DIAGNOSIS — F33.2 SEVERE EPISODE OF RECURRENT MAJOR DEPRESSIVE DISORDER, WITHOUT PSYCHOTIC FEATURES (HCC): ICD-10-CM

## 2025-06-13 DIAGNOSIS — M79.7 FIBROMYALGIA: ICD-10-CM

## 2025-06-13 DIAGNOSIS — Z11.59 NEED FOR HEPATITIS C SCREENING TEST: ICD-10-CM

## 2025-06-13 DIAGNOSIS — R19.7 DIARRHEA, UNSPECIFIED TYPE: ICD-10-CM

## 2025-06-13 PROBLEM — F33.1 MODERATE EPISODE OF RECURRENT MAJOR DEPRESSIVE DISORDER (HCC): Status: ACTIVE | Noted: 2025-06-13

## 2025-06-13 PROCEDURE — 99214 OFFICE O/P EST MOD 30 MIN: CPT | Performed by: NURSE PRACTITIONER

## 2025-06-13 PROCEDURE — G8427 DOCREV CUR MEDS BY ELIG CLIN: HCPCS | Performed by: NURSE PRACTITIONER

## 2025-06-13 PROCEDURE — 4004F PT TOBACCO SCREEN RCVD TLK: CPT | Performed by: NURSE PRACTITIONER

## 2025-06-13 PROCEDURE — G8417 CALC BMI ABV UP PARAM F/U: HCPCS | Performed by: NURSE PRACTITIONER

## 2025-06-13 RX ORDER — AMITRIPTYLINE HYDROCHLORIDE 10 MG/1
10 TABLET ORAL NIGHTLY
Qty: 30 TABLET | Refills: 2 | Status: SHIPPED | OUTPATIENT
Start: 2025-06-13

## 2025-06-13 ASSESSMENT — ENCOUNTER SYMPTOMS: DIARRHEA: 1

## 2025-06-13 NOTE — PROGRESS NOTES
Ms.Andrea HUDSON Altamirano is a 39 y.o. female who presents today for  Chief Complaint   Patient presents with    Established New Doctor     Would like to discuss changing her depression and anxiety medication.        HPI:  History of Present Illness  The patient presents to transition care from Dr. Montenegro.    Patient presents to transition care from Dr. Montenegro.    She has been on Vraylar since 03/2025 per Dr. Montenegro but reports no improvement in her depressive symptoms. She expresses a desire to discontinue the medication due to persistent suicidal ideation, which she feels has intensified since starting the treatment. She has a history of unsuccessful trials with various antidepressants, including Cymbalta, Prozac, Wellbutrin, hydroxyzine, Lamictal, Abilify, trazodone, Zoloft, and Pristiq. Her last psychiatric consultation was several years ago at [facility]. She reports contemplating self-harm but fears potential disability from unsuccessful attempts. She has a past record of suicide attempts during her teenage years, including an incident requiring emergency room intervention and gastric lavage due to pill ingestion.  She continues to have intrusive thoughts but no plan and states she would never act on her thoughts.  Currently, she is not engaged in any form of counseling.    She is currently on gabapentin for fibromyalgia.  She states she uses marijuana regularly.    She also reports bilateral hip pain and limited rotation, which has been ongoing for 3 to 4 months. The pain is exacerbated by certain leg movements and occasionally occurs spontaneously during ambulation. She reports no recent injury or fall and notes that stretching exercises have not provided relief.    She suspects a parasitic infection due to symptoms of brain fog, anxiety, and occasional depression. She has a possible diagnosis of irritable bowel syndrome (IBS), characterized by frequent loose watery stool. She typically consumes coffee in the morning

## 2025-06-17 DIAGNOSIS — D50.9 IRON DEFICIENCY ANEMIA, UNSPECIFIED IRON DEFICIENCY ANEMIA TYPE: ICD-10-CM

## 2025-06-17 DIAGNOSIS — R53.83 FATIGUE, UNSPECIFIED TYPE: ICD-10-CM

## 2025-06-17 DIAGNOSIS — R19.7 DIARRHEA, UNSPECIFIED TYPE: ICD-10-CM

## 2025-06-17 DIAGNOSIS — Z11.59 NEED FOR HEPATITIS C SCREENING TEST: ICD-10-CM

## 2025-06-17 DIAGNOSIS — E55.9 VITAMIN D DEFICIENCY: ICD-10-CM

## 2025-06-17 LAB
25(OH)D3 SERPL-MCNC: 57.5 NG/ML
ALBUMIN SERPL-MCNC: 4.4 G/DL (ref 3.5–5.2)
ALP SERPL-CCNC: 109 U/L (ref 35–104)
ALT SERPL-CCNC: 15 U/L (ref 10–35)
ANION GAP SERPL CALCULATED.3IONS-SCNC: 11 MMOL/L (ref 8–16)
AST SERPL-CCNC: 25 U/L (ref 10–35)
BASOPHILS # BLD: 0 K/UL (ref 0–0.2)
BASOPHILS NFR BLD: 0.6 % (ref 0–1)
BILIRUB SERPL-MCNC: 0.6 MG/DL (ref 0.2–1.2)
BUN SERPL-MCNC: 11 MG/DL (ref 6–20)
C DIFF TOX A+B STL QL IA: NORMAL
CALCIUM SERPL-MCNC: 9.5 MG/DL (ref 8.6–10)
CHLORIDE SERPL-SCNC: 104 MMOL/L (ref 98–107)
CHOLEST SERPL-MCNC: 175 MG/DL (ref 0–199)
CO2 SERPL-SCNC: 25 MMOL/L (ref 22–29)
CREAT SERPL-MCNC: 0.8 MG/DL (ref 0.5–0.9)
CRYPTOSP AG STL QL IA: NORMAL
EOSINOPHIL # BLD: 0.1 K/UL (ref 0–0.6)
EOSINOPHIL NFR BLD: 1.5 % (ref 0–5)
ERYTHROCYTE [DISTWIDTH] IN BLOOD BY AUTOMATED COUNT: 12.8 % (ref 11.5–14.5)
FERRITIN SERPL-MCNC: 60 NG/ML (ref 13–150)
FOLATE SERPL-MCNC: 15.1 NG/ML (ref 4.8–37.3)
G LAMBLIA AG STL QL IA: NORMAL
GLUCOSE SERPL-MCNC: 99 MG/DL (ref 70–99)
HCT VFR BLD AUTO: 40.4 % (ref 37–47)
HCV AB SERPL QL IA: NORMAL
HDLC SERPL-MCNC: 37 MG/DL (ref 40–60)
HGB BLD-MCNC: 13.9 G/DL (ref 12–16)
IMM GRANULOCYTES # BLD: 0 K/UL
IRON SATN MFR SERPL: 45 % (ref 15–50)
IRON SERPL-MCNC: 133 UG/DL (ref 37–145)
LDLC SERPL CALC-MCNC: 106 MG/DL
LYMPHOCYTES # BLD: 1.5 K/UL (ref 1.1–4.5)
LYMPHOCYTES NFR BLD: 28.2 % (ref 20–40)
MCH RBC QN AUTO: 30.2 PG (ref 27–31)
MCHC RBC AUTO-ENTMCNC: 34.4 G/DL (ref 33–37)
MCV RBC AUTO: 87.8 FL (ref 81–99)
MONOCYTES # BLD: 0.5 K/UL (ref 0–0.9)
MONOCYTES NFR BLD: 9.6 % (ref 0–10)
NEUTROPHILS # BLD: 3.3 K/UL (ref 1.5–7.5)
NEUTS SEG NFR BLD: 59.7 % (ref 50–65)
PLATELET # BLD AUTO: 294 K/UL (ref 130–400)
PMV BLD AUTO: 9.2 FL (ref 9.4–12.3)
POTASSIUM SERPL-SCNC: 4.2 MMOL/L (ref 3.5–5.1)
PROT SERPL-MCNC: 6.7 G/DL (ref 6.4–8.3)
RBC # BLD AUTO: 4.6 M/UL (ref 4.2–5.4)
SODIUM SERPL-SCNC: 140 MMOL/L (ref 136–145)
T4 FREE SERPL-MCNC: 1.18 NG/DL (ref 0.93–1.7)
TIBC SERPL-MCNC: 295 UG/DL (ref 250–400)
TRIGL SERPL-MCNC: 162 MG/DL (ref 0–149)
TSH SERPL DL<=0.005 MIU/L-ACNC: 1.05 UIU/ML (ref 0.27–4.2)
VIT B12 SERPL-MCNC: 245 PG/ML (ref 232–1245)
WBC # BLD AUTO: 5.4 K/UL (ref 4.8–10.8)

## 2025-06-19 ENCOUNTER — RESULTS FOLLOW-UP (OUTPATIENT)
Dept: PRIMARY CARE CLINIC | Age: 40
End: 2025-06-19

## 2025-06-19 LAB
BACTERIA STL CULT: NORMAL
C JEJUNI+C COLI AG STL QL: NORMAL
E COLI SXT STL QL IA: NORMAL

## 2025-07-15 ENCOUNTER — TELEPHONE (OUTPATIENT)
Dept: PSYCHIATRY | Age: 40
End: 2025-07-15

## 2025-07-15 NOTE — TELEPHONE ENCOUNTER
Called patient to remind them of their appointment   -Pt confirmed      Reminded patient of their     copay for their appointment. Reminded patient to complete their visit pre-check/digital registration in [employer].    Electronically signed by Alicia D Giraldo-Reyes on 7/15/2025 at 11:29 AM

## 2025-07-16 ENCOUNTER — HOSPITAL ENCOUNTER (OUTPATIENT)
Dept: GENERAL RADIOLOGY | Age: 40
Discharge: HOME | End: 2025-07-16
Payer: MEDICAID

## 2025-07-16 ENCOUNTER — OFFICE VISIT (OUTPATIENT)
Dept: PSYCHIATRY | Age: 40
End: 2025-07-16
Payer: MEDICAID

## 2025-07-16 ENCOUNTER — OFFICE VISIT (OUTPATIENT)
Dept: PRIMARY CARE CLINIC | Age: 40
End: 2025-07-16
Payer: MEDICAID

## 2025-07-16 VITALS
HEART RATE: 90 BPM | WEIGHT: 185.2 LBS | BODY MASS INDEX: 31.62 KG/M2 | HEIGHT: 64 IN | DIASTOLIC BLOOD PRESSURE: 76 MMHG | SYSTOLIC BLOOD PRESSURE: 110 MMHG | OXYGEN SATURATION: 98 % | TEMPERATURE: 97.6 F

## 2025-07-16 VITALS
TEMPERATURE: 98 F | WEIGHT: 187 LBS | BODY MASS INDEX: 31.92 KG/M2 | DIASTOLIC BLOOD PRESSURE: 74 MMHG | SYSTOLIC BLOOD PRESSURE: 110 MMHG | HEART RATE: 104 BPM | OXYGEN SATURATION: 97 % | HEIGHT: 64 IN

## 2025-07-16 DIAGNOSIS — R19.7 DIARRHEA, UNSPECIFIED TYPE: ICD-10-CM

## 2025-07-16 DIAGNOSIS — K58.2 IRRITABLE BOWEL SYNDROME WITH BOTH CONSTIPATION AND DIARRHEA: ICD-10-CM

## 2025-07-16 DIAGNOSIS — M79.7 FIBROMYALGIA: ICD-10-CM

## 2025-07-16 DIAGNOSIS — F33.0 MILD EPISODE OF RECURRENT MAJOR DEPRESSIVE DISORDER: ICD-10-CM

## 2025-07-16 DIAGNOSIS — F41.1 GAD (GENERALIZED ANXIETY DISORDER): ICD-10-CM

## 2025-07-16 DIAGNOSIS — F33.0 MILD EPISODE OF RECURRENT MAJOR DEPRESSIVE DISORDER: Primary | ICD-10-CM

## 2025-07-16 PROBLEM — F33.1 MODERATE EPISODE OF RECURRENT MAJOR DEPRESSIVE DISORDER (HCC): Status: RESOLVED | Noted: 2025-06-13 | Resolved: 2025-07-16

## 2025-07-16 PROBLEM — F33.2 SEVERE EPISODE OF RECURRENT MAJOR DEPRESSIVE DISORDER, WITHOUT PSYCHOTIC FEATURES (HCC): Status: RESOLVED | Noted: 2021-05-12 | Resolved: 2025-07-16

## 2025-07-16 PROCEDURE — G8428 CUR MEDS NOT DOCUMENT: HCPCS | Performed by: PSYCHIATRY & NEUROLOGY

## 2025-07-16 PROCEDURE — 4004F PT TOBACCO SCREEN RCVD TLK: CPT | Performed by: PSYCHIATRY & NEUROLOGY

## 2025-07-16 PROCEDURE — 99214 OFFICE O/P EST MOD 30 MIN: CPT | Performed by: NURSE PRACTITIONER

## 2025-07-16 PROCEDURE — 4004F PT TOBACCO SCREEN RCVD TLK: CPT | Performed by: NURSE PRACTITIONER

## 2025-07-16 PROCEDURE — G8427 DOCREV CUR MEDS BY ELIG CLIN: HCPCS | Performed by: NURSE PRACTITIONER

## 2025-07-16 PROCEDURE — G8417 CALC BMI ABV UP PARAM F/U: HCPCS | Performed by: PSYCHIATRY & NEUROLOGY

## 2025-07-16 PROCEDURE — 74019 RADEX ABDOMEN 2 VIEWS: CPT

## 2025-07-16 PROCEDURE — G8417 CALC BMI ABV UP PARAM F/U: HCPCS | Performed by: NURSE PRACTITIONER

## 2025-07-16 PROCEDURE — 99215 OFFICE O/P EST HI 40 MIN: CPT | Performed by: PSYCHIATRY & NEUROLOGY

## 2025-07-16 RX ORDER — DULOXETIN HYDROCHLORIDE 30 MG/1
30 CAPSULE, DELAYED RELEASE ORAL DAILY
Qty: 30 CAPSULE | Refills: 1 | Status: SHIPPED | OUTPATIENT
Start: 2025-07-16 | End: 2025-08-15

## 2025-07-16 RX ORDER — DICYCLOMINE HYDROCHLORIDE 10 MG/1
10 CAPSULE ORAL 3 TIMES DAILY PRN
Qty: 90 CAPSULE | Refills: 3 | Status: SHIPPED | OUTPATIENT
Start: 2025-07-16

## 2025-07-16 RX ORDER — LITHIUM CARBONATE 150 MG/1
150 CAPSULE ORAL
Qty: 30 CAPSULE | Refills: 1 | Status: SHIPPED | OUTPATIENT
Start: 2025-07-16 | End: 2025-08-15

## 2025-07-16 RX ORDER — POLYETHYLENE GLYCOL 3350 17 G/17G
17 POWDER, FOR SOLUTION ORAL DAILY PRN
Qty: 510 G | Refills: 3 | Status: SHIPPED | OUTPATIENT
Start: 2025-07-16

## 2025-07-16 ASSESSMENT — PATIENT HEALTH QUESTIONNAIRE - PHQ9
7. TROUBLE CONCENTRATING ON THINGS, SUCH AS READING THE NEWSPAPER OR WATCHING TELEVISION: MORE THAN HALF THE DAYS
SUM OF ALL RESPONSES TO PHQ QUESTIONS 1-9: 22
SUM OF ALL RESPONSES TO PHQ QUESTIONS 1-9: 22
3. TROUBLE FALLING OR STAYING ASLEEP: NEARLY EVERY DAY
8. MOVING OR SPEAKING SO SLOWLY THAT OTHER PEOPLE COULD HAVE NOTICED. OR THE OPPOSITE, BEING SO FIGETY OR RESTLESS THAT YOU HAVE BEEN MOVING AROUND A LOT MORE THAN USUAL: NOT AT ALL
4. FEELING TIRED OR HAVING LITTLE ENERGY: NEARLY EVERY DAY
6. FEELING BAD ABOUT YOURSELF - OR THAT YOU ARE A FAILURE OR HAVE LET YOURSELF OR YOUR FAMILY DOWN: NEARLY EVERY DAY
SUM OF ALL RESPONSES TO PHQ QUESTIONS 1-9: 19
1. LITTLE INTEREST OR PLEASURE IN DOING THINGS: NEARLY EVERY DAY
2. FEELING DOWN, DEPRESSED OR HOPELESS: NEARLY EVERY DAY
10. IF YOU CHECKED OFF ANY PROBLEMS, HOW DIFFICULT HAVE THESE PROBLEMS MADE IT FOR YOU TO DO YOUR WORK, TAKE CARE OF THINGS AT HOME, OR GET ALONG WITH OTHER PEOPLE: EXTREMELY DIFFICULT
9. THOUGHTS THAT YOU WOULD BE BETTER OFF DEAD, OR OF HURTING YOURSELF: NEARLY EVERY DAY
SUM OF ALL RESPONSES TO PHQ QUESTIONS 1-9: 22
5. POOR APPETITE OR OVEREATING: MORE THAN HALF THE DAYS

## 2025-07-16 ASSESSMENT — COLUMBIA-SUICIDE SEVERITY RATING SCALE - C-SSRS
6. HAVE YOU EVER DONE ANYTHING, STARTED TO DO ANYTHING, OR PREPARED TO DO ANYTHING TO END YOUR LIFE?: NO
2. HAVE YOU ACTUALLY HAD ANY THOUGHTS OF KILLING YOURSELF?: NO
1. WITHIN THE PAST MONTH, HAVE YOU WISHED YOU WERE DEAD OR WISHED YOU COULD GO TO SLEEP AND NOT WAKE UP?: NO

## 2025-07-16 NOTE — PROGRESS NOTES
Use    Vaping status: Never Used   Substance Use Topics    Alcohol use: Yes     Comment: occasional    Drug use: Not Currently       Family History   Problem Relation Age of Onset    Breast Cancer Maternal Grandmother     High Cholesterol Father     Other Mother         breast cancer gene (not BRCA), has had prophylactic mastectomy       /74   Pulse (!) 104   Temp 98 °F (36.7 °C)   Ht 1.626 m (5' 4\")   Wt 84.8 kg (187 lb)   SpO2 97%   BMI 32.10 kg/m²     Physical Exam  Vitals reviewed.   Constitutional:       General: She is not in acute distress.     Appearance: Normal appearance. She is well-developed.   HENT:      Head: Normocephalic.   Eyes:      Conjunctiva/sclera: Conjunctivae normal.      Pupils: Pupils are equal, round, and reactive to light.   Neck:      Thyroid: No thyromegaly.      Vascular: No carotid bruit or JVD.      Trachea: No tracheal deviation.   Cardiovascular:      Rate and Rhythm: Normal rate and regular rhythm.      Heart sounds: Normal heart sounds. No murmur heard.  Pulmonary:      Effort: Pulmonary effort is normal. No respiratory distress.      Breath sounds: Normal breath sounds. No wheezing or rhonchi.   Abdominal:      General: Abdomen is flat. Bowel sounds are normal. There is no distension.      Palpations: Abdomen is soft. There is no mass.      Tenderness: There is no abdominal tenderness. There is no guarding or rebound.      Hernia: No hernia is present.   Musculoskeletal:         General: Normal range of motion.      Cervical back: Normal range of motion and neck supple.   Lymphadenopathy:      Cervical: No cervical adenopathy.   Skin:     General: Skin is warm and dry.      Findings: No rash.   Neurological:      Mental Status: She is alert.   Psychiatric:         Mood and Affect: Mood normal.         Behavior: Behavior normal.         Thought Content: Thought content normal.         Assessment & Plan  1-2.  IBS, diarrhea  - Symptoms suggest a possible irritable

## 2025-07-16 NOTE — PROGRESS NOTES
Orientation: to person, place, date, and situation.   Language: Intact.   Fund of information: Intact.   Memory: Recent and remote appear intact.   Impulsivity: Limited.   Neurovegitative: Poor appetite and sleep.   Insight: Fair.   Judgment: Fair.      Lab Results   Component Value Date     06/17/2025    K 4.2 06/17/2025     06/17/2025    CO2 25 06/17/2025    BUN 11 06/17/2025    CREATININE 0.8 06/17/2025    GLUCOSE 99 06/17/2025    CALCIUM 9.5 06/17/2025    BILITOT 0.6 06/17/2025    ALKPHOS 109 (H) 06/17/2025    AST 25 06/17/2025    ALT 15 06/17/2025    LABGLOM >90 06/17/2025    GFRAA >59 05/12/2021     Lab Results   Component Value Date/Time     06/17/2025 08:22 AM     08/27/2011 05:15 PM    K 4.2 06/17/2025 08:22 AM    K 3.7 08/27/2011 05:15 PM     06/17/2025 08:22 AM     08/27/2011 05:15 PM    CO2 25 06/17/2025 08:22 AM    BUN 11 06/17/2025 08:22 AM    CREATININE 0.8 06/17/2025 08:22 AM    CREATININE 0.7 08/27/2011 05:15 PM    GLUCOSE 99 06/17/2025 08:22 AM    CALCIUM 9.5 06/17/2025 08:22 AM      Lab Results   Component Value Date    CHOL 175 06/17/2025     Lab Results   Component Value Date    TRIG 162 (H) 06/17/2025     Lab Results   Component Value Date    HDL 37 (L) 06/17/2025     No components found for: \"LDLCHOLESTEROL\", \"LDLCALC\"  No results found for: \"VLDL\"  No results found for: \"CHOLHDLRATIO\"  Lab Results   Component Value Date    LABA1C 5.5 12/08/2020     No results found for: \"EAG\"  Lab Results   Component Value Date    TSH 1.050 06/17/2025     Lab Results   Component Value Date    VITD25 57.5 06/17/2025         Assessment:   1. Mild episode of recurrent major depressive disorder    2. MYA (generalized anxiety disorder)        No evidence of acute suicidality, homicidality or psychosis observed.  Patient is psychiatrically stable. We will call her in a week to follow up.     Plan:  1.  Initiate Cymbalta to help with depression and fibromyalgia symptoms.

## 2025-07-17 ASSESSMENT — ENCOUNTER SYMPTOMS
NAUSEA: 0
WHEEZING: 0
SHORTNESS OF BREATH: 0
COUGH: 0
CHEST TIGHTNESS: 0
ABDOMINAL PAIN: 0
DIARRHEA: 0
SORE THROAT: 0

## 2025-07-22 ENCOUNTER — TELEPHONE (OUTPATIENT)
Dept: PSYCHIATRY | Age: 40
End: 2025-07-22

## 2025-07-22 NOTE — TELEPHONE ENCOUNTER
Per Dr. Patel called and checked on pt since her new pt appt on 7/16.    Pt stated that she is doing ok and nothing has changed.    Electronically signed by Srinivasan Zamarripa on 7/22/2025 at 10:21 AM

## 2025-07-25 ENCOUNTER — TELEPHONE (OUTPATIENT)
Dept: PSYCHIATRY | Age: 40
End: 2025-07-25

## 2025-07-25 ENCOUNTER — OFFICE VISIT (OUTPATIENT)
Dept: PSYCHIATRY | Age: 40
End: 2025-07-25
Payer: MEDICAID

## 2025-07-25 VITALS
HEIGHT: 64 IN | BODY MASS INDEX: 31.45 KG/M2 | WEIGHT: 184.2 LBS | SYSTOLIC BLOOD PRESSURE: 113 MMHG | HEART RATE: 100 BPM | TEMPERATURE: 97.4 F | OXYGEN SATURATION: 96 % | DIASTOLIC BLOOD PRESSURE: 76 MMHG

## 2025-07-25 DIAGNOSIS — F17.200 TOBACCO USE DISORDER: ICD-10-CM

## 2025-07-25 DIAGNOSIS — Z73.0 BURN-OUT: ICD-10-CM

## 2025-07-25 DIAGNOSIS — F33.1 MODERATE EPISODE OF RECURRENT MAJOR DEPRESSIVE DISORDER (HCC): Primary | ICD-10-CM

## 2025-07-25 DIAGNOSIS — F41.1 GAD (GENERALIZED ANXIETY DISORDER): ICD-10-CM

## 2025-07-25 PROCEDURE — G8428 CUR MEDS NOT DOCUMENT: HCPCS | Performed by: PSYCHIATRY & NEUROLOGY

## 2025-07-25 PROCEDURE — 4004F PT TOBACCO SCREEN RCVD TLK: CPT | Performed by: PSYCHIATRY & NEUROLOGY

## 2025-07-25 PROCEDURE — G8417 CALC BMI ABV UP PARAM F/U: HCPCS | Performed by: PSYCHIATRY & NEUROLOGY

## 2025-07-25 PROCEDURE — 99214 OFFICE O/P EST MOD 30 MIN: CPT | Performed by: PSYCHIATRY & NEUROLOGY

## 2025-07-25 RX ORDER — LITHIUM CARBONATE 300 MG/1
300 CAPSULE ORAL
Qty: 30 CAPSULE | Refills: 3 | Status: SHIPPED | OUTPATIENT
Start: 2025-07-25 | End: 2025-08-24

## 2025-07-25 RX ORDER — DULOXETIN HYDROCHLORIDE 60 MG/1
60 CAPSULE, DELAYED RELEASE ORAL DAILY
Qty: 30 CAPSULE | Refills: 3 | Status: SHIPPED | OUTPATIENT
Start: 2025-07-25 | End: 2025-08-24

## 2025-07-25 ASSESSMENT — PATIENT HEALTH QUESTIONNAIRE - PHQ9
4. FEELING TIRED OR HAVING LITTLE ENERGY: NEARLY EVERY DAY
SUM OF ALL RESPONSES TO PHQ QUESTIONS 1-9: 23
9. THOUGHTS THAT YOU WOULD BE BETTER OFF DEAD, OR OF HURTING YOURSELF: NEARLY EVERY DAY
6. FEELING BAD ABOUT YOURSELF - OR THAT YOU ARE A FAILURE OR HAVE LET YOURSELF OR YOUR FAMILY DOWN: NEARLY EVERY DAY
SUM OF ALL RESPONSES TO PHQ QUESTIONS 1-9: 23
SUM OF ALL RESPONSES TO PHQ QUESTIONS 1-9: 23
2. FEELING DOWN, DEPRESSED OR HOPELESS: NEARLY EVERY DAY
5. POOR APPETITE OR OVEREATING: MORE THAN HALF THE DAYS
SUM OF ALL RESPONSES TO PHQ QUESTIONS 1-9: 20
8. MOVING OR SPEAKING SO SLOWLY THAT OTHER PEOPLE COULD HAVE NOTICED. OR THE OPPOSITE, BEING SO FIGETY OR RESTLESS THAT YOU HAVE BEEN MOVING AROUND A LOT MORE THAN USUAL: NOT AT ALL
3. TROUBLE FALLING OR STAYING ASLEEP: NEARLY EVERY DAY
10. IF YOU CHECKED OFF ANY PROBLEMS, HOW DIFFICULT HAVE THESE PROBLEMS MADE IT FOR YOU TO DO YOUR WORK, TAKE CARE OF THINGS AT HOME, OR GET ALONG WITH OTHER PEOPLE: EXTREMELY DIFFICULT
7. TROUBLE CONCENTRATING ON THINGS, SUCH AS READING THE NEWSPAPER OR WATCHING TELEVISION: NEARLY EVERY DAY
1. LITTLE INTEREST OR PLEASURE IN DOING THINGS: NEARLY EVERY DAY

## 2025-07-25 ASSESSMENT — COLUMBIA-SUICIDE SEVERITY RATING SCALE - C-SSRS
6. HAVE YOU EVER DONE ANYTHING, STARTED TO DO ANYTHING, OR PREPARED TO DO ANYTHING TO END YOUR LIFE?: NO
1. WITHIN THE PAST MONTH, HAVE YOU WISHED YOU WERE DEAD OR WISHED YOU COULD GO TO SLEEP AND NOT WAKE UP?: NO
2. HAVE YOU ACTUALLY HAD ANY THOUGHTS OF KILLING YOURSELF?: NO

## 2025-07-25 NOTE — TELEPHONE ENCOUNTER
Called patient to see if they wanted to take an open spot with Dr. Patel. Booked for today 07/25/25 @ 12pm.    Electronically signed by Alicia D Giraldo-Reyes on 7/25/2025 at 8:35 AM

## 2025-07-25 NOTE — PROGRESS NOTES
\"EAG\"  Lab Results   Component Value Date    TSH 1.050 06/17/2025     Lab Results   Component Value Date    VITD25 57.5 06/17/2025         Assessment:   1. Moderate episode of recurrent major depressive disorder (HCC)    2. MYA (generalized anxiety disorder)    3. Tobacco use disorder    4. Burn-out          No evidence of acute suicidality, homicidality or psychosis observed.  Patient is psychiatrically stable. We will call her in a week to follow up.     Plan:  1.  Increase Cymbalta and Lithium to help with mood stabilization.   The risks, benefits, side effects, indications, contraindications, and adverse effects of the medications have been discussed. Yes.  2. The pt has verbalized understanding and has capacity to give informed consent.  3. The Prem report has been reviewed according to HB1 regulations.  4. Supportive therapy offered. Set up with our therapist.   5. Follow up: Return in about 4 weeks (around 8/22/2025).  6. The patient has been advised to call with any problems.  Instructed to call suicide hotline, 911 or come to the ER if suicidal or symptoms worsen.  7. Controlled substance Treatment Plan:   8. The above listed medications have been continued, modifications in meds and other orders/labs as follows:    No orders of the defined types were placed in this encounter.     No orders of the defined types were placed in this encounter.      9. Additional comments:         10.Over 50% of the total visit time of   30  minutes was spent on counseling and/or coordination of care of:                        1. Moderate episode of recurrent major depressive disorder (HCC)    2. MYA (generalized anxiety disorder)    3. Tobacco use disorder    4. Burn-out              Gretchen Patel MD

## 2025-07-28 ENCOUNTER — TELEPHONE (OUTPATIENT)
Dept: PSYCHIATRY | Age: 40
End: 2025-07-28

## 2025-07-28 NOTE — TELEPHONE ENCOUNTER
Per  called and lvm asking pt to return phone call to let MA know how she is doing with the med changes    Electronically signed by Srinivasan Zamarripa on 7/28/2025 at 11:44 AM

## 2025-07-31 ENCOUNTER — OFFICE VISIT (OUTPATIENT)
Dept: OBGYN CLINIC | Age: 40
End: 2025-07-31
Payer: MEDICAID

## 2025-07-31 VITALS
DIASTOLIC BLOOD PRESSURE: 81 MMHG | HEIGHT: 64 IN | BODY MASS INDEX: 31.41 KG/M2 | HEART RATE: 112 BPM | SYSTOLIC BLOOD PRESSURE: 114 MMHG | WEIGHT: 184 LBS

## 2025-07-31 DIAGNOSIS — Z30.431 ENCOUNTER FOR ROUTINE CHECKING OF INTRAUTERINE CONTRACEPTIVE DEVICE (IUD): ICD-10-CM

## 2025-07-31 DIAGNOSIS — Z12.31 ENCOUNTER FOR SCREENING MAMMOGRAM FOR MALIGNANT NEOPLASM OF BREAST: ICD-10-CM

## 2025-07-31 DIAGNOSIS — Z01.419 WELL WOMAN EXAM WITH ROUTINE GYNECOLOGICAL EXAM: Primary | ICD-10-CM

## 2025-07-31 DIAGNOSIS — Z11.51 ENCOUNTER FOR SCREENING FOR HUMAN PAPILLOMAVIRUS (HPV): ICD-10-CM

## 2025-07-31 DIAGNOSIS — Z12.4 SCREENING FOR CERVICAL CANCER: ICD-10-CM

## 2025-07-31 PROCEDURE — 99396 PREV VISIT EST AGE 40-64: CPT | Performed by: NURSE PRACTITIONER

## 2025-08-11 RX ORDER — LITHIUM CARBONATE 300 MG/1
300 CAPSULE ORAL
Qty: 30 CAPSULE | Refills: 3 | Status: SHIPPED | OUTPATIENT
Start: 2025-08-11 | End: 2025-08-13

## 2025-08-13 RX ORDER — LITHIUM CARBONATE 150 MG/1
150 CAPSULE ORAL
Qty: 30 CAPSULE | Refills: 3 | Status: SHIPPED | OUTPATIENT
Start: 2025-08-13 | End: 2025-09-12

## 2025-08-22 ENCOUNTER — TELEPHONE (OUTPATIENT)
Dept: PSYCHIATRY | Age: 40
End: 2025-08-22

## 2025-08-25 ENCOUNTER — OFFICE VISIT (OUTPATIENT)
Dept: PSYCHIATRY | Age: 40
End: 2025-08-25
Payer: MEDICAID

## 2025-08-25 DIAGNOSIS — F33.0 MILD RECURRENT MAJOR DEPRESSION: Primary | ICD-10-CM

## 2025-08-25 DIAGNOSIS — F41.1 GENERALIZED ANXIETY DISORDER: ICD-10-CM

## 2025-08-25 PROCEDURE — 90834 PSYTX W PT 45 MINUTES: CPT | Performed by: COUNSELOR

## 2025-08-25 PROCEDURE — 4004F PT TOBACCO SCREEN RCVD TLK: CPT | Performed by: COUNSELOR

## 2025-08-25 ASSESSMENT — COLUMBIA-SUICIDE SEVERITY RATING SCALE - C-SSRS
6. HAVE YOU EVER DONE ANYTHING, STARTED TO DO ANYTHING, OR PREPARED TO DO ANYTHING TO END YOUR LIFE?: NO
2. HAVE YOU ACTUALLY HAD ANY THOUGHTS OF KILLING YOURSELF?: NO
1. WITHIN THE PAST MONTH, HAVE YOU WISHED YOU WERE DEAD OR WISHED YOU COULD GO TO SLEEP AND NOT WAKE UP?: YES

## 2025-08-25 ASSESSMENT — PATIENT HEALTH QUESTIONNAIRE - PHQ9
7. TROUBLE CONCENTRATING ON THINGS, SUCH AS READING THE NEWSPAPER OR WATCHING TELEVISION: MORE THAN HALF THE DAYS
SUM OF ALL RESPONSES TO PHQ QUESTIONS 1-9: 14
2. FEELING DOWN, DEPRESSED OR HOPELESS: MORE THAN HALF THE DAYS
SUM OF ALL RESPONSES TO PHQ QUESTIONS 1-9: 14
5. POOR APPETITE OR OVEREATING: NOT AT ALL
SUM OF ALL RESPONSES TO PHQ QUESTIONS 1-9: 12
10. IF YOU CHECKED OFF ANY PROBLEMS, HOW DIFFICULT HAVE THESE PROBLEMS MADE IT FOR YOU TO DO YOUR WORK, TAKE CARE OF THINGS AT HOME, OR GET ALONG WITH OTHER PEOPLE: VERY DIFFICULT
3. TROUBLE FALLING OR STAYING ASLEEP: SEVERAL DAYS
1. LITTLE INTEREST OR PLEASURE IN DOING THINGS: MORE THAN HALF THE DAYS
8. MOVING OR SPEAKING SO SLOWLY THAT OTHER PEOPLE COULD HAVE NOTICED. OR THE OPPOSITE, BEING SO FIGETY OR RESTLESS THAT YOU HAVE BEEN MOVING AROUND A LOT MORE THAN USUAL: NOT AT ALL
9. THOUGHTS THAT YOU WOULD BE BETTER OFF DEAD, OR OF HURTING YOURSELF: MORE THAN HALF THE DAYS
4. FEELING TIRED OR HAVING LITTLE ENERGY: NEARLY EVERY DAY
SUM OF ALL RESPONSES TO PHQ QUESTIONS 1-9: 14
6. FEELING BAD ABOUT YOURSELF - OR THAT YOU ARE A FAILURE OR HAVE LET YOURSELF OR YOUR FAMILY DOWN: MORE THAN HALF THE DAYS

## 2025-08-25 ASSESSMENT — ANXIETY QUESTIONNAIRES
2. NOT BEING ABLE TO STOP OR CONTROL WORRYING: MORE THAN HALF THE DAYS
4. TROUBLE RELAXING: NEARLY EVERY DAY
6. BECOMING EASILY ANNOYED OR IRRITABLE: SEVERAL DAYS
1. FEELING NERVOUS, ANXIOUS, OR ON EDGE: NEARLY EVERY DAY
3. WORRYING TOO MUCH ABOUT DIFFERENT THINGS: NEARLY EVERY DAY
7. FEELING AFRAID AS IF SOMETHING AWFUL MIGHT HAPPEN: NEARLY EVERY DAY
5. BEING SO RESTLESS THAT IT IS HARD TO SIT STILL: SEVERAL DAYS
GAD7 TOTAL SCORE: 16

## (undated) DEVICE — Z INACTIVE USE 2854097 SPONGE GZ W4XL4IN COT 12 PLY TYP VII WVN C FLD DSGN

## (undated) DEVICE — SYR 10ML L/L CONTROL: Brand: MEDLINE INDUSTRIES, INC.

## (undated) DEVICE — PITCHER PT 1200ML W HNDL CSR WRP

## (undated) DEVICE — Z DISCONTINUED NO SUB IDED NEEDLE HYPO 27GA L1.25IN GRY POLYPR HUB S STL REG BVL STR

## (undated) DEVICE — Device

## (undated) DEVICE — TOWEL,OR,DSP,ST,BLUE,STD,4/PK,20PK/CS: Brand: MEDLINE

## (undated) DEVICE — CANISTER, RIGID, 2000CC: Brand: MEDLINE INDUSTRIES, INC.

## (undated) DEVICE — TUBING, SUCTION, 1/4" X 12', STRAIGHT: Brand: MEDLINE

## (undated) DEVICE — NEEDLE HYPO 18GA L1.5IN PNK POLYPR HUB S STL REG BVL STR

## (undated) DEVICE — COVER,MAYO STAND,STERILE: Brand: MEDLINE

## (undated) DEVICE — SPONGE GZ W4XL4IN COT 12 PLY TYP VII WVN C FLD DSGN STERILE

## (undated) DEVICE — CONTAINER,SPECIMEN,OR STERILE,4OZ: Brand: MEDLINE

## (undated) DEVICE — SPONGE,NEURO,0.5"X3",XR,STRL,LF,10/PK: Brand: MEDLINE

## (undated) DEVICE — Device: Brand: OLYMPUS

## (undated) DEVICE — CIRCUIT ANES AD SZ 5 FACE MASK TBNG L75IN BG 3L 2 LIMB